# Patient Record
Sex: MALE | Race: WHITE | Employment: OTHER | URBAN - METROPOLITAN AREA
[De-identification: names, ages, dates, MRNs, and addresses within clinical notes are randomized per-mention and may not be internally consistent; named-entity substitution may affect disease eponyms.]

---

## 2017-08-30 ENCOUNTER — TELEPHONE (OUTPATIENT)
Dept: DERMATOLOGY | Age: 77
End: 2017-08-30

## 2017-09-05 ENCOUNTER — OFFICE VISIT (OUTPATIENT)
Dept: DERMATOLOGY | Age: 77
End: 2017-09-05

## 2017-09-05 DIAGNOSIS — L57.0 AK (ACTINIC KERATOSIS): ICD-10-CM

## 2017-09-05 DIAGNOSIS — C44.729 SQUAMOUS CELL CARCINOMA OF LEFT LOWER LEG: ICD-10-CM

## 2017-09-05 DIAGNOSIS — D48.5 NEOPLASM OF UNCERTAIN BEHAVIOR OF SKIN: Primary | ICD-10-CM

## 2017-09-05 DIAGNOSIS — L43.9 LICHEN PLANUS: ICD-10-CM

## 2017-09-05 PROCEDURE — 11101 PR BIOPSY, EACH ADDED LESION: CPT | Performed by: DERMATOLOGY

## 2017-09-05 PROCEDURE — 11100 PR BIOPSY OF SKIN LESION: CPT | Performed by: DERMATOLOGY

## 2017-09-05 PROCEDURE — 17262 DSTRJ MAL LES T/A/L 1.1-2.0: CPT | Performed by: DERMATOLOGY

## 2017-09-05 PROCEDURE — 69100 BIOPSY OF EXTERNAL EAR: CPT | Performed by: DERMATOLOGY

## 2017-09-05 PROCEDURE — 99202 OFFICE O/P NEW SF 15 MIN: CPT | Performed by: DERMATOLOGY

## 2017-09-05 RX ORDER — ESCITALOPRAM OXALATE 10 MG/1
5 TABLET ORAL DAILY
COMMUNITY

## 2017-09-11 ENCOUNTER — TELEPHONE (OUTPATIENT)
Dept: DERMATOLOGY | Age: 77
End: 2017-09-11

## 2017-09-21 ENCOUNTER — PROCEDURE VISIT (OUTPATIENT)
Dept: DERMATOLOGY | Age: 77
End: 2017-09-21

## 2017-09-21 DIAGNOSIS — C44.91 BASAL CELL CARCINOMA OF SKIN: ICD-10-CM

## 2017-09-21 DIAGNOSIS — Z79.899 HIGH RISK MEDICATION USE: ICD-10-CM

## 2017-09-21 DIAGNOSIS — C44.92 SQUAMOUS CELL SKIN CANCER: Primary | ICD-10-CM

## 2017-09-21 DIAGNOSIS — L57.0 AK (ACTINIC KERATOSIS): ICD-10-CM

## 2017-09-21 PROCEDURE — 99214 OFFICE O/P EST MOD 30 MIN: CPT | Performed by: DERMATOLOGY

## 2017-09-21 PROCEDURE — 17262 DSTRJ MAL LES T/A/L 1.1-2.0: CPT | Performed by: DERMATOLOGY

## 2017-09-21 PROCEDURE — 17261 DSTRJ MAL LES T/A/L .6-1.0CM: CPT | Performed by: DERMATOLOGY

## 2017-09-27 ENCOUNTER — OFFICE VISIT (OUTPATIENT)
Dept: DERMATOLOGY | Age: 77
End: 2017-09-27

## 2017-09-27 DIAGNOSIS — C44.91 BASAL CELL CARCINOMA OF SKIN: ICD-10-CM

## 2017-09-27 DIAGNOSIS — Z79.899 HIGH RISK MEDICATION USE: ICD-10-CM

## 2017-09-27 DIAGNOSIS — Z85.828 HISTORY OF NONMELANOMA SKIN CANCER: ICD-10-CM

## 2017-09-27 DIAGNOSIS — L57.0 AK (ACTINIC KERATOSIS): ICD-10-CM

## 2017-09-27 DIAGNOSIS — C44.92 SQUAMOUS CELL SKIN CANCER: Primary | ICD-10-CM

## 2017-09-27 PROCEDURE — 96406 CHEMO INTRALESIONAL OVER 7: CPT | Performed by: DERMATOLOGY

## 2017-09-27 PROCEDURE — 99214 OFFICE O/P EST MOD 30 MIN: CPT | Performed by: DERMATOLOGY

## 2017-09-28 DIAGNOSIS — Z79.60 LONG-TERM USE OF IMMUNOSUPPRESSANT MEDICATION: ICD-10-CM

## 2017-09-28 DIAGNOSIS — C44.92 SQUAMOUS CELL SKIN CANCER: Primary | ICD-10-CM

## 2017-10-12 ENCOUNTER — OFFICE VISIT (OUTPATIENT)
Dept: DERMATOLOGY | Age: 77
End: 2017-10-12

## 2017-10-12 DIAGNOSIS — L57.0 AK (ACTINIC KERATOSIS): ICD-10-CM

## 2017-10-12 DIAGNOSIS — Z85.828 HISTORY OF NONMELANOMA SKIN CANCER: ICD-10-CM

## 2017-10-12 DIAGNOSIS — C44.91 BASAL CELL CARCINOMA OF SKIN: ICD-10-CM

## 2017-10-12 DIAGNOSIS — L57.8 DIFFUSE PHOTODAMAGE OF SKIN: ICD-10-CM

## 2017-10-12 DIAGNOSIS — Z79.899 HIGH RISK MEDICATION USE: ICD-10-CM

## 2017-10-12 DIAGNOSIS — C44.92 SQUAMOUS CELL SKIN CANCER: Primary | ICD-10-CM

## 2017-10-12 PROCEDURE — 99213 OFFICE O/P EST LOW 20 MIN: CPT | Performed by: DERMATOLOGY

## 2017-10-12 PROCEDURE — 96406 CHEMO INTRALESIONAL OVER 7: CPT | Performed by: DERMATOLOGY

## 2017-10-12 RX ORDER — ACITRETIN 25 MG/1
CAPSULE ORAL
COMMUNITY
Start: 2017-09-14 | End: 2017-10-12 | Stop reason: SDUPTHER

## 2017-10-12 RX ORDER — ACITRETIN 25 MG/1
CAPSULE ORAL
Qty: 90 CAPSULE | Refills: 1 | Status: SHIPPED | OUTPATIENT
Start: 2017-10-12 | End: 2017-11-30 | Stop reason: SDUPTHER

## 2017-10-12 NOTE — PROGRESS NOTES
[facility]) Dermatology  Darnell Mcclelland M.D.  [phone]        Bers  1940    67 y.o. male     Date of Visit: 10/12/2017    Chief Complaint:   Chief Complaint   Patient presents with    Squamous Cell Carcinoma     5 FU injections        I was asked to see this patient by Dr. Pineda ref. provider found. History of Present Illness:  1. 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 He continues on acitretin 25 mg p.o. q. day. Did have laboratory studies drawn including CBC and liver function tests which are within normal limits. Tolerating acitretin without difficulty. Has had good improvement of squamous cell carcinomas with intralesional 5-fluorouracil. Has had 2 previous injections. Multiple lesions have resolved or reduced in size. Still has multiple squamous cell carcinomas. He does have a biopsy-proven basal cell carcinoma on his left helix. Mohs surgery is planned for this lesion with Sai Bates    He also has widespread actinic keratoses on his face. Has done Efudex previously but it was many years ago. Has more recently been treated with liquid nitrogen. Widespread severe photodamage-patient aware of need for hats, sunscreen, sun avoidance      Review of Systems:  Constitutional: Reports general sense of well-being       Past Medical History, Surgical History, Family History, Medications and Allergies reviewed. Social History:   Social History     Social History    Marital status:      Spouse name: N/A    Number of children: N/A    Years of education: N/A     Occupational History    Not on file.      Social History Main Topics    Smoking status: Never Smoker    Smokeless tobacco: Never Used    Alcohol use Yes    Drug use: Unknown    Sexual activity: Not on file     Other Topics Concern    Not on file     Social History Narrative    No narrative on file       Physical Examination       -General: Well-appearing, NAD  Limited exam.  Multiple gritty erythematous papules over his face, almost confluent-actinic keratoses. Background erythema and scale and lower legs improved but multiple keratotic papules ranging in size from 4-1.3 cm. Diffuse severe photo damage with freckling and lentigines                  Assessment and Plan     1. Squamous cell skin cancer -All lesions initially anesthetized with 1% lidocaine with epinephrine -IL 5-fluorouracil  50 mg/ml; total 1.5 ml to 11 lesion(s). Specifically discussed risk of side effects including infection, skin necrosis, systemic absorption. 2. High risk medication use -Continue acitretin 25 mg p.o. q. day. Liver function tests within normal limits. Recheck 1 month liver function tests. Reviewed potential side effects. 3. AK (actinic keratosis) -After completion of intralesional fluorouracil to lower legs will consider topical fluorouracil to face. 4. Basal cell carcinoma of skin - scheduled Mohs surgery with Darell Graves    5. Diffuse photodamage of skin -Hats, sunscreen, sun avoidance    6.  History of nonmelanoma skin cancer -Careful surveillance

## 2017-10-16 ENCOUNTER — TELEPHONE (OUTPATIENT)
Dept: DERMATOLOGY | Age: 77
End: 2017-10-16

## 2017-10-16 DIAGNOSIS — C44.219 BASAL CELL CARCINOMA OF SKIN OF LEFT EAR: Primary | ICD-10-CM

## 2017-10-26 ENCOUNTER — PROCEDURE VISIT (OUTPATIENT)
Dept: DERMATOLOGY | Age: 77
End: 2017-10-26

## 2017-10-26 DIAGNOSIS — L57.0 AK (ACTINIC KERATOSIS): ICD-10-CM

## 2017-10-26 DIAGNOSIS — D48.5 NEOPLASM OF UNCERTAIN BEHAVIOR OF SKIN: ICD-10-CM

## 2017-10-26 DIAGNOSIS — L57.8 DIFFUSE PHOTODAMAGE OF SKIN: ICD-10-CM

## 2017-10-26 DIAGNOSIS — C44.92 SQUAMOUS CELL SKIN CANCER: Primary | ICD-10-CM

## 2017-10-26 PROCEDURE — 99214 OFFICE O/P EST MOD 30 MIN: CPT | Performed by: DERMATOLOGY

## 2017-10-26 PROCEDURE — 11100 PR BIOPSY OF SKIN LESION: CPT | Performed by: DERMATOLOGY

## 2017-10-26 PROCEDURE — 96406 CHEMO INTRALESIONAL OVER 7: CPT | Performed by: DERMATOLOGY

## 2017-10-26 NOTE — PROGRESS NOTES
Subjective:      Patient ID: Donell Ramirez is a 67 y.o. male.     HPI    Review of Systems    Objective:   Physical Exam

## 2017-11-02 ENCOUNTER — TELEPHONE (OUTPATIENT)
Dept: DERMATOLOGY | Age: 77
End: 2017-11-02

## 2017-11-02 DIAGNOSIS — C44.319 BASAL CELL CARCINOMA OF SKIN OF OTHER PART OF FACE: Primary | ICD-10-CM

## 2017-11-02 NOTE — TELEPHONE ENCOUNTER
Reviewed results of the biopsy with the patient. The patient expressed understanding of the plan.      Referral faxed to Dr. Basim rutledge

## 2017-11-08 DIAGNOSIS — Z79.899 LONG-TERM USE OF HIGH-RISK MEDICATION: Primary | ICD-10-CM

## 2017-11-20 ENCOUNTER — TELEPHONE (OUTPATIENT)
Dept: DERMATOLOGY | Age: 77
End: 2017-11-20

## 2017-11-20 NOTE — TELEPHONE ENCOUNTER
Dr. Ted Molina would like to speak to  in regards to the patients lab results.     Dr. Ted Molina can be reached at [phone]

## 2017-11-21 NOTE — TELEPHONE ENCOUNTER
Patient's primary care doctor contacted me because his hemoglobin and hematocrit are elevated. He wondered if this could be related to intralesional 5-fluorouracil. Discussed that usually when people develop hematologic abnormalities from 5-fluorouracil, there is bone marrow suppression. Dr. Jacob Salas noted the patient does have a history of mild renal insufficiency-suspect that he is hemoconcentrated and will follow up.

## 2017-11-30 ENCOUNTER — OFFICE VISIT (OUTPATIENT)
Dept: DERMATOLOGY | Age: 77
End: 2017-11-30

## 2017-11-30 DIAGNOSIS — L57.0 AK (ACTINIC KERATOSIS): ICD-10-CM

## 2017-11-30 DIAGNOSIS — C44.92 SQUAMOUS CELL SKIN CANCER: Primary | ICD-10-CM

## 2017-11-30 DIAGNOSIS — L57.8 DIFFUSE PHOTODAMAGE OF SKIN: ICD-10-CM

## 2017-11-30 PROCEDURE — 99213 OFFICE O/P EST LOW 20 MIN: CPT | Performed by: DERMATOLOGY

## 2017-11-30 PROCEDURE — 96406 CHEMO INTRALESIONAL OVER 7: CPT | Performed by: DERMATOLOGY

## 2017-11-30 RX ORDER — FLUOROURACIL 50 MG/G
CREAM TOPICAL
Qty: 40 G | Refills: 0 | Status: SHIPPED | OUTPATIENT
Start: 2017-11-30 | End: 2019-04-11 | Stop reason: ALTCHOICE

## 2017-11-30 RX ORDER — ACITRETIN 25 MG/1
CAPSULE ORAL
Qty: 90 CAPSULE | Refills: 1 | Status: SHIPPED | OUTPATIENT
Start: 2017-11-30 | End: 2018-02-27 | Stop reason: SDUPTHER

## 2017-11-30 NOTE — PROGRESS NOTES
[address]  Dermatology  Davide Posada M.D.  [phone]       Ryley Reynolds  1940    65 y.o. male     Date of Visit: 11/30/2017    Chief Complaint:   Chief Complaint   Patient presents with    Follow-up     5FU        I was asked to see this patient by Dr. Pineda ref. provider found. History of Present Illness:  1. Patient presents today for follow-up    Patient has a history of squamous cell carcinomas on bilateral lower legs. He has been on acitretin since September 2017, currently on acitretin 25 mg p.o. q. day. He has tolerated this without difficulty-mild xerosis of his lips. Labs have been stable, with elevated hemoglobin and hematocrit-spoke with primary care doctor regarding this last week. Patient states that hemoglobin and hematocrit have been elevated since chemotherapy for metastatic colon cancer. This has been previously evaluated by his oncologist.    He has also had 4 previous injections of 5-fluorouracil to squamous cell carcinomas on his lower legs. Excellent improvement. He still has a few small residual lesions on his lower legs. 2 newer keratotic papules, one on each dorsal forearm. Widespread actinic damage with multiple actinic keratoses on his face. We have planned for Efudex January 2017. Skin history:  History of metastatic colon cancer currently in remission. No prior history of sebaceous carcinoma. History of multiple nonmelanoma skin cancers including multiple squamous cell carcinomas treated previously in his lower legs with curette, Mohs surgery, liquid nitrogen at outside hospital    11/17 right lateral forehead basal cell carcinoma status post Mohs  11/17 left mid helix basal cell carcinoma status post Mohs      Review of Systems:  Constitutional: Reports general sense of well-being       Past Medical History, Surgical History, Family History, Medications and Allergies reviewed.     Social History:   Social History     Social History    Marital status:      Spouse name: N/A    Number of children: N/A    Years of education: N/A     Occupational History    Not on file. Social History Main Topics    Smoking status: Never Smoker    Smokeless tobacco: Never Used    Alcohol use Yes    Drug use: Unknown    Sexual activity: Not on file     Other Topics Concern    Not on file     Social History Narrative    No narrative on file       Physical Examination       -General: Well-appearing, NAD  Well-developed well-nourished. 1.0 cm keratotic plaque left dorsal forearm and right dorsal forearm. 3 smaller keratotic papules bilateral lower legs-residual squamous cell carcinomas, previously injected with 5-fluorouracil. Widespread areas of postinflammatory erythema. Diffuse severe actinic change with multiple gritty erythematous papules over his face, scalp, ears. Well-healed scar right forehead at site of most recent Mohs surgery. Left helix granulating with no sign of infection. Assessment and Plan     1. Squamous cell skin cancer - After the risks, palpitations, alternatives were explained to the patient, informed consent was obtained. 5 squamous cell carcinomas, one left forearm, one right forearm, 2 left lower leg, one right lower leg all injected with a total of 0.8 mL of 50 mg/mL of 5-fluorouracil. Wound care instructions and contact information given to the patient. Continue acitretin 25 mg p.o. q. day. Refill provided. Reviewed side effects and contraindications. Patient will be due for repeat lab check in 6 weeks-after his next appointment. He will continue to follow with his primary care doctor and oncologist for elevated hemoglobin and hematocrit. 2. AK (actinic keratosis) -Efudex 5% cream b.i.d. ×2 weeks scalp, forehead, temples, cheeks, tops of the ears-avoiding areas around mouth- for 2 weeks prior to his next appointment in January. Patient has completed Efudex course this previously. Reviewed side effects, contraindications, proper use. Discussed importance of photoprotection.      3. Diffuse photodamage of skin -strict sun avoidance with hats, sunscreen, sun avoidance

## 2018-01-24 ENCOUNTER — TELEPHONE (OUTPATIENT)
Dept: DERMATOLOGY | Age: 78
End: 2018-01-24

## 2018-01-24 NOTE — TELEPHONE ENCOUNTER
Hui Olmedo is ill, he is running a fever. He is scheduled tomorrow for an efudex follow up but I told him that if he is running a fever he shouldn't come in. He is on his 14th day now and is wondering if he should stop it. When could he come in next week?

## 2018-02-08 ENCOUNTER — PROCEDURE VISIT (OUTPATIENT)
Dept: DERMATOLOGY | Age: 78
End: 2018-02-08

## 2018-02-08 DIAGNOSIS — L30.0 NUMMULAR DERMATITIS: ICD-10-CM

## 2018-02-08 DIAGNOSIS — Z79.899 LONG-TERM USE OF HIGH-RISK MEDICATION: ICD-10-CM

## 2018-02-08 DIAGNOSIS — D48.5 NEOPLASM OF UNCERTAIN BEHAVIOR OF SKIN: Primary | ICD-10-CM

## 2018-02-08 DIAGNOSIS — L57.0 AK (ACTINIC KERATOSIS): ICD-10-CM

## 2018-02-08 DIAGNOSIS — Z85.828 HISTORY OF NONMELANOMA SKIN CANCER: ICD-10-CM

## 2018-02-08 PROCEDURE — 11100 PR BIOPSY OF SKIN LESION: CPT | Performed by: DERMATOLOGY

## 2018-02-08 PROCEDURE — 99214 OFFICE O/P EST MOD 30 MIN: CPT | Performed by: DERMATOLOGY

## 2018-02-08 PROCEDURE — 11101 PR BIOPSY, EACH ADDED LESION: CPT | Performed by: DERMATOLOGY

## 2018-02-13 ENCOUNTER — TELEPHONE (OUTPATIENT)
Dept: DERMATOLOGY | Age: 78
End: 2018-02-13

## 2018-02-20 ENCOUNTER — TELEPHONE (OUTPATIENT)
Dept: DERMATOLOGY | Age: 78
End: 2018-02-20

## 2018-02-27 RX ORDER — ACITRETIN 25 MG/1
CAPSULE ORAL
Qty: 90 CAPSULE | Refills: 1 | Status: SHIPPED | OUTPATIENT
Start: 2018-02-27 | End: 2018-07-18 | Stop reason: SDUPTHER

## 2018-02-27 NOTE — TELEPHONE ENCOUNTER
Pt calling states that [facility] needs approval to fill medication Acitretin 25mg cap pls call [facility] to discuss any questions pls call pt to @ [phone] thank you

## 2018-04-25 ENCOUNTER — OFFICE VISIT (OUTPATIENT)
Dept: DERMATOLOGY | Age: 78
End: 2018-04-25

## 2018-04-25 DIAGNOSIS — L57.0 AK (ACTINIC KERATOSIS): ICD-10-CM

## 2018-04-25 DIAGNOSIS — L57.8 DIFFUSE PHOTODAMAGE OF SKIN: ICD-10-CM

## 2018-04-25 DIAGNOSIS — Z85.828 HISTORY OF NONMELANOMA SKIN CANCER: ICD-10-CM

## 2018-04-25 DIAGNOSIS — C44.519 BASAL CELL CARCINOMA OF BACK: ICD-10-CM

## 2018-04-25 DIAGNOSIS — Z85.820 HISTORY OF MALIGNANT MELANOMA: ICD-10-CM

## 2018-04-25 DIAGNOSIS — D48.5 NEOPLASM OF UNCERTAIN BEHAVIOR OF SKIN: ICD-10-CM

## 2018-04-25 DIAGNOSIS — L43.9 LICHEN PLANUS: ICD-10-CM

## 2018-04-25 DIAGNOSIS — Z79.899 HIGH RISK MEDICATION USE: Primary | ICD-10-CM

## 2018-04-25 PROCEDURE — 17000 DESTRUCT PREMALG LESION: CPT | Performed by: DERMATOLOGY

## 2018-04-25 PROCEDURE — 11101 PR BIOPSY, EACH ADDED LESION: CPT | Performed by: DERMATOLOGY

## 2018-04-25 PROCEDURE — 11901 INJECT SKIN LESIONS >7: CPT | Performed by: DERMATOLOGY

## 2018-04-25 PROCEDURE — 99214 OFFICE O/P EST MOD 30 MIN: CPT | Performed by: DERMATOLOGY

## 2018-04-25 PROCEDURE — 17003 DESTRUCT PREMALG LES 2-14: CPT | Performed by: DERMATOLOGY

## 2018-04-25 PROCEDURE — 17261 DSTRJ MAL LES T/A/L .6-1.0CM: CPT | Performed by: DERMATOLOGY

## 2018-04-25 PROCEDURE — 11100 PR BIOPSY OF SKIN LESION: CPT | Performed by: DERMATOLOGY

## 2018-05-01 ENCOUNTER — TELEPHONE (OUTPATIENT)
Dept: DERMATOLOGY | Age: 78
End: 2018-05-01

## 2018-05-01 DIAGNOSIS — D03.61 MELANOMA IN SITU OF RIGHT UPPER EXTREMITY INCLUDING SHOULDER (HCC): Primary | ICD-10-CM

## 2018-07-18 ENCOUNTER — OFFICE VISIT (OUTPATIENT)
Dept: DERMATOLOGY | Age: 78
End: 2018-07-18

## 2018-07-18 DIAGNOSIS — L82.1 SEBORRHEIC KERATOSES: ICD-10-CM

## 2018-07-18 DIAGNOSIS — C44.629 SQUAMOUS CELL CARCINOMA OF LEFT WRIST: ICD-10-CM

## 2018-07-18 DIAGNOSIS — Z79.899 HIGH RISK MEDICATION USE: ICD-10-CM

## 2018-07-18 DIAGNOSIS — C44.619 BASAL CELL CARCINOMA OF LEFT FOREARM: ICD-10-CM

## 2018-07-18 DIAGNOSIS — D22.9 BENIGN NEVUS: ICD-10-CM

## 2018-07-18 DIAGNOSIS — C44.41 BASAL CELL CARCINOMA OF NECK: ICD-10-CM

## 2018-07-18 DIAGNOSIS — Z85.828 HISTORY OF NONMELANOMA SKIN CANCER: ICD-10-CM

## 2018-07-18 DIAGNOSIS — D48.5 NEOPLASM OF UNCERTAIN BEHAVIOR OF SKIN: Primary | ICD-10-CM

## 2018-07-18 DIAGNOSIS — Z85.820 HISTORY OF MALIGNANT MELANOMA: ICD-10-CM

## 2018-07-18 DIAGNOSIS — L57.0 AK (ACTINIC KERATOSIS): ICD-10-CM

## 2018-07-18 PROCEDURE — 17003 DESTRUCT PREMALG LES 2-14: CPT | Performed by: DERMATOLOGY

## 2018-07-18 PROCEDURE — 17000 DESTRUCT PREMALG LESION: CPT | Performed by: DERMATOLOGY

## 2018-07-18 PROCEDURE — 17261 DSTRJ MAL LES T/A/L .6-1.0CM: CPT | Performed by: DERMATOLOGY

## 2018-07-18 PROCEDURE — 17271 DSTR MAL LES S/N/H/F/G 0.6-1: CPT | Performed by: DERMATOLOGY

## 2018-07-18 PROCEDURE — 11100 PR BIOPSY OF SKIN LESION: CPT | Performed by: DERMATOLOGY

## 2018-07-18 PROCEDURE — 99214 OFFICE O/P EST MOD 30 MIN: CPT | Performed by: DERMATOLOGY

## 2018-07-18 PROCEDURE — 17262 DSTRJ MAL LES T/A/L 1.1-2.0: CPT | Performed by: DERMATOLOGY

## 2018-07-18 RX ORDER — ACITRETIN 25 MG/1
CAPSULE ORAL
Qty: 90 CAPSULE | Refills: 1 | Status: SHIPPED | OUTPATIENT
Start: 2018-07-18 | End: 2018-11-29 | Stop reason: SDUPTHER

## 2018-07-18 NOTE — PROGRESS NOTES
Systems:  Constitutional: Reports general sense of well-being   Denies acitretin related side effects    Past Medical History, Surgical History, Family History, Medications and Allergies reviewed. Social History:   Social History     Social History    Marital status:      Spouse name: N/A    Number of children: N/A    Years of education: N/A     Occupational History    Not on file. Social History Main Topics    Smoking status: Never Smoker    Smokeless tobacco: Never Used    Alcohol use Yes    Drug use: Unknown    Sexual activity: Not on file     Other Topics Concern    Not on file     Social History Narrative    No narrative on file       Physical Examination       -General: Well-appearing, NAD  1. .Normal affect. Total body skin exam including scalp, face, neck, chest, abdomen, back, bilateral upper extremities, bilateral lower extremities, ocular conjunctiva, external lips, and nails was performed. Examination normal unless stated below. Underwear area not examined. Scattered on the trunk and extremities are multiple well-defined round and oval symmetric smoothly-bordered uniformly brown macules and papules. Multiple gritty erythematous papules face, scalp, torso, arms. Mild xerosis of his lips    Multiple hyperkeratotic stuck on papules and plaques over his torso          Left dorsal distal forearm 1.1 cm erythematous plaque rule out basal cell carcinoma  Left thenar wrist 7 mm pink papule rule out basal cell carcinoma  Left mid paraspinal back 1 cm irregularly pigmented brown plaque-irritated seborrheic keratosis rule out atypia  Mid posterior neck 8 mm pink papule rule out basal cell carcinoma      Assessment and Plan     1.  Neoplasm of uncertain behavior of skin   Left dorsal distal forearm 1.1 cm erythematous plaque rule out basal cell carcinoma  Left thenar wrist 7 mm pink papule rule out basal cell carcinoma  Left mid paraspinal back 1 cm irregularly pigmented Aric Humphrey

## 2018-07-24 ENCOUNTER — TELEPHONE (OUTPATIENT)
Dept: DERMATOLOGY | Age: 78
End: 2018-07-24

## 2018-07-24 NOTE — TELEPHONE ENCOUNTER
Reviewed results of the biopsy with the patient. Date of biopsy: 7/18/18  Site of biopsy: Left dorsal distal forearm  Result: BCC, nodular type    Plan: Tx with curette at bx    The patient expressed understanding of the plan. Reviewed results of the biopsy with the patient. Date of biopsy: 7/18/18  Site of biopsy: Left thenar wrist  Result: SCC, well differentiated    Plan: Tx with curette at bx    The patient expressed understanding of the plan. Reviewed results of the biopsy with the patient. Date of biopsy: 7/18/18  Site of biopsy: Left mid paraspinal keratosis  Result: Inflamed SK    Plan: No further treatment    The patient expressed understanding of the plan. Reviewed results of the biopsy with the patient. Date of biopsy: 7/18/18  Site of biopsy: Mid posterior neck   Result: BCC, superficial type    Plan: Tx with curette at bx    The patient expressed understanding of the plan.

## 2018-09-13 ENCOUNTER — OFFICE VISIT (OUTPATIENT)
Dept: DERMATOLOGY | Age: 78
End: 2018-09-13

## 2018-09-13 DIAGNOSIS — C44.519: ICD-10-CM

## 2018-09-13 DIAGNOSIS — L43.9 LICHEN PLANUS: ICD-10-CM

## 2018-09-13 DIAGNOSIS — Z85.820 HISTORY OF MALIGNANT MELANOMA: ICD-10-CM

## 2018-09-13 DIAGNOSIS — L57.0 AK (ACTINIC KERATOSIS): ICD-10-CM

## 2018-09-13 DIAGNOSIS — Z79.899 HIGH RISK MEDICATION USE: ICD-10-CM

## 2018-09-13 DIAGNOSIS — D48.5 NEOPLASM OF UNCERTAIN BEHAVIOR OF SKIN: Primary | ICD-10-CM

## 2018-09-13 DIAGNOSIS — C44.619 BASAL CELL CARCINOMA, ARM, LEFT: ICD-10-CM

## 2018-09-13 DIAGNOSIS — D22.9 BENIGN NEVUS: ICD-10-CM

## 2018-09-13 DIAGNOSIS — Z85.828 HISTORY OF NONMELANOMA SKIN CANCER: ICD-10-CM

## 2018-09-13 PROCEDURE — 17003 DESTRUCT PREMALG LES 2-14: CPT | Performed by: DERMATOLOGY

## 2018-09-13 PROCEDURE — 17262 DSTRJ MAL LES T/A/L 1.1-2.0: CPT | Performed by: DERMATOLOGY

## 2018-09-13 PROCEDURE — 99214 OFFICE O/P EST MOD 30 MIN: CPT | Performed by: DERMATOLOGY

## 2018-09-13 PROCEDURE — 17000 DESTRUCT PREMALG LESION: CPT | Performed by: DERMATOLOGY

## 2018-09-13 PROCEDURE — 11100 PR BIOPSY OF SKIN LESION: CPT | Performed by: DERMATOLOGY

## 2018-09-13 RX ORDER — ATORVASTATIN CALCIUM 10 MG/1
10 TABLET, FILM COATED ORAL DAILY
COMMUNITY
Start: 2018-08-30 | End: 2021-04-22

## 2018-09-20 LAB — DERMATOLOGY PATHOLOGY REPORT: ABNORMAL

## 2018-09-25 ENCOUNTER — TELEPHONE (OUTPATIENT)
Dept: DERMATOLOGY | Age: 78
End: 2018-09-25

## 2018-10-11 ENCOUNTER — PROCEDURE VISIT (OUTPATIENT)
Dept: DERMATOLOGY | Age: 78
End: 2018-10-11
Payer: MEDICARE

## 2018-10-11 DIAGNOSIS — Z85.828 HISTORY OF NONMELANOMA SKIN CANCER: ICD-10-CM

## 2018-10-11 DIAGNOSIS — L43.9 LICHEN PLANUS: Primary | ICD-10-CM

## 2018-10-11 DIAGNOSIS — Z85.820 HISTORY OF MALIGNANT MELANOMA: ICD-10-CM

## 2018-10-11 DIAGNOSIS — L57.8 DIFFUSE PHOTODAMAGE OF SKIN: ICD-10-CM

## 2018-10-11 PROCEDURE — 11901 INJECT SKIN LESIONS >7: CPT | Performed by: DERMATOLOGY

## 2018-10-11 PROCEDURE — 99213 OFFICE O/P EST LOW 20 MIN: CPT | Performed by: DERMATOLOGY

## 2018-10-11 NOTE — PROGRESS NOTES
every other day chemo prevention     Lichen planus- clobetasol, intralesional Kenalog    Review of Systems:  Constitutional: Reports general sense of well-being       Past Medical History, Surgical History, Family History, Medications and Allergies reviewed. Social History:   Social History     Social History    Marital status:      Spouse name: N/A    Number of children: N/A    Years of education: N/A     Occupational History    Not on file. Social History Main Topics    Smoking status: Never Smoker    Smokeless tobacco: Never Used    Alcohol use Yes    Drug use: Unknown    Sexual activity: Not on file     Other Topics Concern    Not on file     Social History Narrative    No narrative on file       Physical Examination       -General: Well-appearing, NAD  1. Limited exam-granulating curettage sites of basal cell carcinoma no sign of infection. Multiple erythematous scaly papules and plaques over his lower legs and dorsal feet-lichen planus. Diffuse background photodamage with freckling and lentigines. Multiple well-healed scars no sign of recurrence      Assessment and Plan     1. Lichen planus - -IL kenalog 10 mg/ml; total 1.5 ml to 10 lesion(s). Edu re: atrophy, dyspigmentation. Sample of Ultravate lotion to be used b.i.d. for the next 2 weeks. Reviewed proper use and side effects     2. Diffuse photodamage of skin -Hats, sunscreen, sun avoidance    3. History of nonmelanoma skin cancer -Healing well at most recent treatment of her basal cell carcinomas. Continue acitretin 25 mg every other day as he has been.      4. History of malignant melanoma -Monitor carefully for new or changing pigmented lesion

## 2018-11-29 ENCOUNTER — OFFICE VISIT (OUTPATIENT)
Dept: DERMATOLOGY | Age: 78
End: 2018-11-29
Payer: MEDICARE

## 2018-11-29 DIAGNOSIS — D22.9 BENIGN NEVUS: ICD-10-CM

## 2018-11-29 DIAGNOSIS — C44.519 BASAL CELL CARCINOMA (BCC) OF BACK: ICD-10-CM

## 2018-11-29 DIAGNOSIS — Z79.899 HIGH RISK MEDICATION USE: ICD-10-CM

## 2018-11-29 DIAGNOSIS — Z85.820 HISTORY OF MALIGNANT MELANOMA: ICD-10-CM

## 2018-11-29 DIAGNOSIS — C44.629 SCC (SQUAMOUS CELL CARCINOMA), HAND, LEFT: ICD-10-CM

## 2018-11-29 DIAGNOSIS — C44.619 BASAL CELL CARCINOMA, ARM, LEFT: ICD-10-CM

## 2018-11-29 DIAGNOSIS — R79.89 OTHER SPECIFIED ABNORMAL FINDINGS OF BLOOD CHEMISTRY: ICD-10-CM

## 2018-11-29 DIAGNOSIS — L57.0 AK (ACTINIC KERATOSIS): ICD-10-CM

## 2018-11-29 DIAGNOSIS — Z85.828 HISTORY OF NONMELANOMA SKIN CANCER: ICD-10-CM

## 2018-11-29 DIAGNOSIS — L43.9 LICHEN PLANUS: Primary | ICD-10-CM

## 2018-11-29 DIAGNOSIS — D48.5 NEOPLASM OF UNCERTAIN BEHAVIOR OF SKIN: ICD-10-CM

## 2018-11-29 PROCEDURE — 17003 DESTRUCT PREMALG LES 2-14: CPT | Performed by: DERMATOLOGY

## 2018-11-29 PROCEDURE — 11100 PR BIOPSY OF SKIN LESION: CPT | Performed by: DERMATOLOGY

## 2018-11-29 PROCEDURE — 17261 DSTRJ MAL LES T/A/L .6-1.0CM: CPT | Performed by: DERMATOLOGY

## 2018-11-29 PROCEDURE — 17272 DSTR MAL LES S/N/H/F/G 1.1-2: CPT | Performed by: DERMATOLOGY

## 2018-11-29 PROCEDURE — 99214 OFFICE O/P EST MOD 30 MIN: CPT | Performed by: DERMATOLOGY

## 2018-11-29 PROCEDURE — 17000 DESTRUCT PREMALG LESION: CPT | Performed by: DERMATOLOGY

## 2018-11-29 PROCEDURE — 11101 PR BIOPSY, EACH ADDED LESION: CPT | Performed by: DERMATOLOGY

## 2018-11-29 RX ORDER — ACITRETIN 25 MG/1
CAPSULE ORAL
Qty: 90 CAPSULE | Refills: 1 | Status: SHIPPED | OUTPATIENT
Start: 2018-11-29 | End: 2019-01-17 | Stop reason: SDUPTHER

## 2018-11-29 RX ORDER — CLOBETASOL PROPIONATE 0.5 MG/G
OINTMENT TOPICAL
Qty: 120 G | Refills: 2 | Status: SHIPPED | OUTPATIENT
Start: 2018-11-29

## 2018-11-29 NOTE — PROGRESS NOTES
squamous cell carcinoma status post curettage  7/18 mid posterior neck superficial basal cell carcinoma status post curettage     Acitretin 25 mg every other day chemo prevention     Lichen planus- clobetasol, intralesional Kenalog     Review of Systems:  Constitutional: Reports general sense of well-being   Mild xerosis of lips. No nausea, vomiting    Past Medical History, Surgical History, Family History, Medications and Allergies reviewed. Social History:   Social History     Social History    Marital status:      Spouse name: N/A    Number of children: N/A    Years of education: N/A     Occupational History    Not on file. Social History Main Topics    Smoking status: Never Smoker    Smokeless tobacco: Never Used    Alcohol use Yes    Drug use: Unknown    Sexual activity: Not on file     Other Topics Concern    Not on file     Social History Narrative    No narrative on file       Physical Examination       -General: Well-appearing, NAD  1. Normal affect. Total body skin exam including scalp, face, neck, chest, abdomen, back, bilateral upper extremities, bilateral lower extremities, ocular conjunctiva, external lips, and nails was performed. Examination normal unless stated below. Underwear area not examined. Scattered on the trunk and extremities are multiple well-defined round and oval symmetric smoothly-bordered uniformly brown macules and papules. Multiple gritty erythematous papules ears, scalp, forehead  Widespread erythematous scaly papules and plaques over his lower legs, lower back, forearms-lichen planus.   Multiple well-healed scars with no sign of recurrence                      Left lateral thigh 7 mm pink papule rule out basal cell carcinoma  Left dorsal hand 1.1 cm keratotic plaque rule out squamous cell carcinoma  Left thenar forearm 7 mm pink papule rule out basal cell carcinoma  Right superior shoulder 7 mm pink papule rule out basal cell carcinoma  Right scapular back 6 mm pink papule rule out basal cell carcinoma    AST/ALT 10/8/18 within normal limits, recent cholesterol check with primary care doctor-reportedly well controlled    Assessment and Plan     1. Lichen planus - Increase acitretin to 25 mg p.o. q. day. Start clobetasol ointment b.i.d. to his bilateral lower legs until his next visit 6-8 weeks. Reviewed proper use and side effects. Lab check for acitretin prior to his next visit-cholesterol, LFTs    2. AK (actinic keratosis) - 5, scalp, ears, face lesion(s) treated w/ liquid nitrogen. Edu re: risk of blister formation, discomfort, scar, hypopigmentation. Discussed wound care. 3. Neoplasm of uncertain behavior of skin - left lateral thigh, left dorsal hand, left thenar forearm, right superior shoulder, right scapular back--Discussed possible diagnosis. Patient agreeable to biopsy. Verbal consent obtained after risks (infection, bleeding, scar), benefits and alternatives explained. -Area(s) to be biopsied were marked with a surgical pen. Site(s) were cleansed with alcohol. Local anesthesia achieved with 1% lidocaine with epinephrine/sodium bicarbonate. Shave biopsy performed with a razor blade. Hemostasis was achieved with aluminum chloride. The wound(s) were dressed with petrolatum and covered with a bandage. Specimen(s) sent to pathology. Pt educated re: risk of bleeding, infection, scar and wound care instructions. Curettage performed after informed written consent obtained following explanation of risks, benefits, alternatives  -Local anesthesia acheived with 1% lidocaine with epinephrine/sodium bicarbonate. Sharp curettage performed in 3 different directions. First pass size left lateral thigh 8 mm, left dorsal hand 1.2 cm, left thenar forearm 9 mm, right superior shoulder 9 mm, right scapular back 8 mm.  Hemostasis obtained with aluminum chloride and electrocautery.   -Edu re: bleeding, discomfort, infection, scar  -Edu re: wound care, risk of

## 2018-12-04 ENCOUNTER — TELEPHONE (OUTPATIENT)
Dept: DERMATOLOGY | Age: 78
End: 2018-12-04

## 2018-12-04 LAB
DERMATOLOGY PATHOLOGY REPORT: ABNORMAL
DERMATOLOGY PATHOLOGY REPORT: NORMAL

## 2019-01-17 ENCOUNTER — OFFICE VISIT (OUTPATIENT)
Dept: DERMATOLOGY | Age: 79
End: 2019-01-17
Payer: MEDICARE

## 2019-01-17 DIAGNOSIS — Z85.828 HISTORY OF NONMELANOMA SKIN CANCER: ICD-10-CM

## 2019-01-17 DIAGNOSIS — C44.529 SQUAMOUS CELL CARCINOMA OF SKIN OF CHEST: ICD-10-CM

## 2019-01-17 DIAGNOSIS — D48.5 NEOPLASM OF UNCERTAIN BEHAVIOR OF SKIN: Primary | ICD-10-CM

## 2019-01-17 DIAGNOSIS — L57.0 AK (ACTINIC KERATOSIS): ICD-10-CM

## 2019-01-17 DIAGNOSIS — D22.9 BENIGN NEVUS: ICD-10-CM

## 2019-01-17 DIAGNOSIS — L43.9 LICHEN PLANUS: ICD-10-CM

## 2019-01-17 DIAGNOSIS — Z85.820 HISTORY OF MALIGNANT MELANOMA: ICD-10-CM

## 2019-01-17 DIAGNOSIS — Z79.899 HIGH RISK MEDICATION USE: ICD-10-CM

## 2019-01-17 PROCEDURE — 17003 DESTRUCT PREMALG LES 2-14: CPT | Performed by: DERMATOLOGY

## 2019-01-17 PROCEDURE — 11103 TANGNTL BX SKIN EA SEP/ADDL: CPT | Performed by: DERMATOLOGY

## 2019-01-17 PROCEDURE — 17261 DSTRJ MAL LES T/A/L .6-1.0CM: CPT | Performed by: DERMATOLOGY

## 2019-01-17 PROCEDURE — 11102 TANGNTL BX SKIN SINGLE LES: CPT | Performed by: DERMATOLOGY

## 2019-01-17 PROCEDURE — 17000 DESTRUCT PREMALG LESION: CPT | Performed by: DERMATOLOGY

## 2019-01-17 PROCEDURE — 99214 OFFICE O/P EST MOD 30 MIN: CPT | Performed by: DERMATOLOGY

## 2019-01-17 RX ORDER — ACITRETIN 25 MG/1
CAPSULE ORAL
Qty: 90 CAPSULE | Refills: 1 | Status: SHIPPED | OUTPATIENT
Start: 2019-01-17 | End: 2019-09-18 | Stop reason: SDUPTHER

## 2019-01-21 LAB — DERMATOLOGY PATHOLOGY REPORT: ABNORMAL

## 2019-01-24 ENCOUNTER — TELEPHONE (OUTPATIENT)
Dept: DERMATOLOGY | Age: 79
End: 2019-01-24

## 2019-01-24 DIAGNOSIS — C44.319 BASAL CELL CARCINOMA (BCC) OF RIGHT CHEEK: Primary | ICD-10-CM

## 2019-02-08 ENCOUNTER — TELEPHONE (OUTPATIENT)
Dept: DERMATOLOGY | Age: 79
End: 2019-02-08

## 2019-04-11 ENCOUNTER — OFFICE VISIT (OUTPATIENT)
Dept: DERMATOLOGY | Age: 79
End: 2019-04-11
Payer: MEDICARE

## 2019-04-11 DIAGNOSIS — Z79.899 HIGH RISK MEDICATION USE: ICD-10-CM

## 2019-04-11 DIAGNOSIS — C44.42 SQUAMOUS CELL CARCINOMA OF SCALP: ICD-10-CM

## 2019-04-11 DIAGNOSIS — L82.1 SEBORRHEIC KERATOSES: ICD-10-CM

## 2019-04-11 DIAGNOSIS — Z85.820 HISTORY OF MALIGNANT MELANOMA: ICD-10-CM

## 2019-04-11 DIAGNOSIS — C44.622 SQUAMOUS CELL CARCINOMA OF ARM, RIGHT: ICD-10-CM

## 2019-04-11 DIAGNOSIS — L43.9 LICHEN PLANUS: ICD-10-CM

## 2019-04-11 DIAGNOSIS — D48.5 NEOPLASM OF UNCERTAIN BEHAVIOR OF SKIN: Primary | ICD-10-CM

## 2019-04-11 DIAGNOSIS — C44.619 BASAL CELL CARCINOMA, ARM, LEFT: ICD-10-CM

## 2019-04-11 DIAGNOSIS — Z85.828 HISTORY OF NONMELANOMA SKIN CANCER: ICD-10-CM

## 2019-04-11 DIAGNOSIS — D22.9 BENIGN NEVUS: ICD-10-CM

## 2019-04-11 PROCEDURE — 11102 TANGNTL BX SKIN SINGLE LES: CPT | Performed by: DERMATOLOGY

## 2019-04-11 PROCEDURE — 99214 OFFICE O/P EST MOD 30 MIN: CPT | Performed by: DERMATOLOGY

## 2019-04-11 PROCEDURE — 17261 DSTRJ MAL LES T/A/L .6-1.0CM: CPT | Performed by: DERMATOLOGY

## 2019-04-11 PROCEDURE — 11103 TANGNTL BX SKIN EA SEP/ADDL: CPT | Performed by: DERMATOLOGY

## 2019-04-11 PROCEDURE — 17262 DSTRJ MAL LES T/A/L 1.1-2.0: CPT | Performed by: DERMATOLOGY

## 2019-04-11 PROCEDURE — 17272 DSTR MAL LES S/N/H/F/G 1.1-2: CPT | Performed by: DERMATOLOGY

## 2019-04-11 NOTE — PROGRESS NOTES
[facility]) Dermatology  Nadeem Lane M.D.  [phone]       Jessica Sinclair  1940    77 y.o. male     Date of Visit: 4/11/2019    Chief Complaint:   Chief Complaint   Patient presents with    Skin Lesion        I was asked to see this patient by Dr. iPneda ref. provider found. History of Present Illness:  1. Follow-up of multiple issues and total body skin exam    Lichen planus-has noted excellent improvement with clobetasol ointment which he uses as needed for new areas of involvement, and acitretin 25 mg p.o. q. day. Had previously reduced his dose to 25 mg every other day but is now taking this consistently every day. Tolerating acitretin without difficulty. Last lab draw was in January. Knows to limit his alcohol intake. Does have moderate xerosis especially of lips. History of melanoma-has multiple nevi. Have been stable in size, shape, color. Has not noticed any new or changing pigmented lesions. Does monitor his skin for change. Does wear hats and sunscreen    History of nonmelanoma skin cancer-on chemoprevention of acitretin 25 mg every other day for multiple squamous cell carcinomas. Has noted a new keratotic plaque on the vertex of his scalp. It has been present about a month. Has increased in size. Mildly tender. Has also noticed an erythematous papule on his left forearm and right forearm. Nontender. Not growing. Healed well after most recent Mohs surgery 3/19 for basal cell carcinoma right cheek. Has a previous history of widespread squamous cell carcinomas lower legs. These resolved with intralesional 5-fluorouracil and acitretin.     Skin history:  History of metastatic colon cancer currently in remission.  No prior history of sebaceous carcinoma.     History of multiple nonmelanoma skin cancers including multiple squamous cell carcinomas treated previously in his lower legs with curette, Mohs surgery, liquid nitrogen at outside hospital     11/17 right lateral forehead basal cell carcinoma status post Mohs  11/17 left mid helix basal cell carcinoma status post Mohs  1/18 Efudex face and scalp  2/18 malignant melanoma right mid lateral back status post wide local excision by Jannette Christensen on 3/1/18. 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  4/18 right mid lateral back basal cell carcinoma status post curettage  7/18 left dorsal distal forearm nodular basal cell carcinoma treated with curettage  7/18 left thenar wrist well-differentiated squamous cell carcinoma status post curettage  7/18 mid posterior neck superficial basal cell carcinoma status post curettage  11/18 left dorsal hand superficial squamous cell carcinoma status post curettage  11/18 left thenar forearm nodular basal cell carcinoma status post curettage  11/18 right scapular back superficial basal cell carcinoma status post curettage  1/19 left chest superficial squamous cell carcinoma status post curettage  1/19 right lateral supra-brow at Mohs site-actinic keratosis  1/19 right mid cheek superficial basal cell carcinoma status post Mohs 3/19     Acitretin 25 mg every other day chemo prevention- increased to 25 mg p.o. q. day 11/18. 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 These resolved on acitretin and with intralesional 5-fluorouracil.     Lichen planus- clobetasol, intralesional Kenalog, acitretin      Review of Systems:  Constitutional: Reports general sense of well-being   Skin: No new or changing pigmented lesions. No new rashes. Past Medical History, Surgical History, Family History, Medications and Allergies reviewed.     Social History:   Social History     Socioeconomic History    Marital status:      Spouse name: Not on file    Number of children: Not on file    Years of education: Not on file    Highest education level: Not on file   Occupational History    Not on file   Social Needs    Financial resource strain: Not on file    Food insecurity:     Worry: Not on file     Inability: Not on file    Transportation needs:     Medical: Not on file     Non-medical: Not on file   Tobacco Use    Smoking status: Never Smoker    Smokeless tobacco: Never Used   Substance and Sexual Activity    Alcohol use: Yes    Drug use: Not on file    Sexual activity: Not on file   Lifestyle    Physical activity:     Days per week: Not on file     Minutes per session: Not on file    Stress: Not on file   Relationships    Social connections:     Talks on phone: Not on file     Gets together: Not on file     Attends [employer] service: Not on file     Active member of club or organization: Not on file     Attends meetings of clubs or organizations: Not on file     Relationship status: Not on file    Intimate partner violence:     Fear of current or ex partner: Not on file     Emotionally abused: Not on file     Physically abused: Not on file     Forced sexual activity: Not on file   Other Topics Concern    Not on file   Social History Narrative    Not on file       Physical Examination       -General: Well-appearing, NAD  Normal affect. Total body skin exam including scalp, face, neck, chest, abdomen, back, bilateral upper extremities, bilateral lower extremities, ocular conjunctiva, external lips, and nails was performed. Examination normal unless stated below. Underwear area not examined. Scattered on the trunk and extremities are multiple well-defined round and oval symmetric smoothly-bordered uniformly brown macules and papules. Postinflammatory change lower legs-a few scattered erythematous-purple papules consistent with history of lichen planus. Mild xerosis of lips. Diffuse severe photo damage.   Multiple well-healed scars with no sign of recurrence                  Right posterior vertex of scalp 1.2 cm keratotic plaque rule out squamous cell carcinoma  Left ulnar forearm 1.0 cm erythematous plaque rule out basal cell carcinoma  Left scapular tip 7 mm irregular tan papule rule out atypia  Mid spinal back 1.3 cm brown plaque rule out atypia  Right anterior forearm 6 mm pearly pink papule rule out basal cell carcinoma  Left ala 4 mm pink papule rule out basal cell carcinoma      Assessment and Plan     1. Neoplasm of uncertain behavior of skin   Right posterior vertex of scalp 1.2 cm keratotic plaque rule out squamous cell carcinoma  Left ulnar forearm 1.0 cm erythematous plaque rule out basal cell carcinoma  Left scapular tip 7 mm irregular tan papule rule out atypia  Mid spinal back 1.3 cm brown plaque rule out atypia  Right anterior forearm 6 mm pearly pink papule rule out basal cell carcinoma  Left ala 4 mm pink papule rule out basal cell carcinoma    -Discussed possible diagnosis. Patient agreeable to biopsy. Verbal consent obtained after risks (infection, bleeding, scar), benefits and alternatives explained. -Area(s) to be biopsied were marked with a surgical pen. Site(s) were cleansed with alcohol. Local anesthesia achieved with 1% lidocaine with epinephrine/sodium bicarbonate. Shave biopsy performed with a razor blade. Hemostasis was achieved with aluminum chloride. The wound(s) were dressed with petrolatum and covered with a bandage. Specimen(s) sent to pathology. Pt educated re: risk of bleeding, infection, scar and wound care instructions. Note: Biopsy left ala with a small amount of bleeding after aluminum chloride. Offered cautery, which patient denied-did not want more anesthesia. Pressure dressing applied. He will let me know if lesion continues to bleed. Right anterior forearm, left ulnar forearm, right posterior vertex of scalp only-  Curettage performed after informed written consent obtained following explanation of risks, benefits, alternatives  -Local anesthesia acheived with 1% lidocaine with epinephrine/sodium bicarbonate.  Sharp curettage performed in 3 different directions. First pass size right posterior vertex of scalp 13 mm, left ulnar forearm 11 mm, right anterior forearm 6 mm Hemostasis obtained with aluminum chloride.   -Edu re: bleeding, discomfort, infection, scar  -Edu re: wound care, risk of recurrence  Discussed risk of recurrence especially right posterior vertex of scalp-Mohs if lesion recurs  Recheck 6-8 weeks at follow-up     2. Benign nevus - Benign acquired melanocytic nevi  -Recommend monthly self skin exams   -Educated regarding the ABCDEs of melanoma detection   -Call for any new/changing moles or concerning lesions  -Reviewed sun protective behavior -- sun avoidance during the peak hours of the day, sun-protective clothing (including hat and sunglasses), sunscreen use (water resistant, broad spectrum, SPF at least 30, need for reapplication every 2 to 3 hours), avoidance of tanning beds \   3. Seborrheic keratoses -monitor for change    4. Lichen planus -continue acitretin 25 mg p.o. q. day. Clobetasol as needed    5. High risk medication use -continue acitretin 25 mg p.o. q. day. Due for labs at next visit. Continue to moderate alcohol intake    6. History of malignant melanoma - No evidence of recurrence. Discussed risk of subsequent skin cancers and increased risk of melanoma. Reviewed importance of monitoring skin for change and sun protection with hats and sunscreen, as well as sun avoidance. 7. History of nonmelanoma skin cancer - No evidence of recurrence. Discussed risk of subsequent skin cancers and increased risk of melanoma. Reviewed importance of monitoring skin for change and sun protection with hats and sunscreen, as well as sun avoidance. Pathology report:  DIAGNOSIS:   A.  Left scapular tip-   Dysplastic nevus with moderate dysplasia, completely   excised   There is proliferation of focally atypical melanocytes at   the junction, singly and in nests that focally bridge   between ridges.  The underlying dermis shows fibroplasia   and patchy lymphocytic infiltration.  The dysplasia is   moderate.  The changes are occurring within a junction   nevus.  The lesion is completely excised. -Monitor for recurrence   B. Mid Spinal Back-   Basosquamous acanthoma, inflamed   An epidermal hypertrophy composed of reticulations of   mature polygonal and basaloid cells enclosing multiple   variably sized horn cysts.  There is no suggestion of   malignancy.  The lesion is chronically inflamed. -Benign, no further therapy   C. Right anterior forearm-   Basal Cell Carcinoma, Nodular Type   Within the dermis there are variably sized nests of basal   cell carcinoma. - Treated with curettage at the time of biopsy, monitor for recurrence   D. Left ala-   Actinic keratosis, hypertrophic (see description)   There is a proliferation of atypical keratinocytes along   the basal layer.  There is overlying hyperparakeratosis. The lesion is focally transected at the base and underlying   invasion cannot be ruled out with certainty. COMMENT: Multiple deeper cuts were done. Dr. Selena Ann also   reviewed this case and concurs with the diagnosis. - Recheck at follow-up   E. RIght post vertex-   Squamous cell carcinoma, moderately differentiated   There are atypical keratinocytes with moderate pleomorphism   proliferating within the epithelium.  These cells are   extending into the underlying dermis as irregularly shaped   islands. -Treated with curettage at the time of biopsy. Recheck at scheduled follow-up. Mohs if lesion recurs   F. Left ulnar forearm-   Squamous cell carcinoma, moderately differentiated   There are atypical keratinocytes with moderate pleomorphism   proliferating within the epithelium.  These cells are   extending into the underlying dermis as irregularly shaped   islands. -Treated with curettage at time of biopsy, monitor for recurrence   COMMENT: Multiple deeper cuts were done. 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  Dr. Selena Ann also reviewed this case   and concurs with the diagnosis.

## 2019-04-16 LAB — DERMATOLOGY PATHOLOGY REPORT: ABNORMAL

## 2019-04-17 ENCOUNTER — TELEPHONE (OUTPATIENT)
Dept: DERMATOLOGY | Age: 79
End: 2019-04-17

## 2019-04-17 NOTE — TELEPHONE ENCOUNTER
----- Message from Roberto Hernandez MD sent at 4/17/2019  9:48 AM EDT -----  A-monitor for recurrence  Be-benign  C-treated with curettage at the time of biopsy, monitor for recurrence  D-monitor for recurrence, treat with liquid nitrogen at follow-up if needed  E-treated with curettage at the time of biopsy. Recheck at follow-up.   Mohs if lesion recurs  Asked-treated with curettage at the time of biopsy, monitor for recurrence

## 2019-05-21 ENCOUNTER — TELEPHONE (OUTPATIENT)
Dept: DERMATOLOGY | Age: 79
End: 2019-05-21

## 2019-05-21 NOTE — TELEPHONE ENCOUNTER
Called and LM for pt to call the office. Per Dr. Katherine Lora please change pt f/u appointment to 5/23/19 if patient is available.

## 2019-06-05 ENCOUNTER — OFFICE VISIT (OUTPATIENT)
Dept: DERMATOLOGY | Age: 79
End: 2019-06-05
Payer: MEDICARE

## 2019-06-05 DIAGNOSIS — Z79.899 HIGH RISK MEDICATION USE: ICD-10-CM

## 2019-06-05 DIAGNOSIS — Z85.828 HISTORY OF NONMELANOMA SKIN CANCER: ICD-10-CM

## 2019-06-05 DIAGNOSIS — C44.612 BASAL CELL CARCINOMA (BCC) OF RIGHT SHOULDER: ICD-10-CM

## 2019-06-05 DIAGNOSIS — D48.5 NEOPLASM OF UNCERTAIN BEHAVIOR OF SKIN: Primary | ICD-10-CM

## 2019-06-05 DIAGNOSIS — L57.0 KERATOSIS, ACTINIC: ICD-10-CM

## 2019-06-05 DIAGNOSIS — Z85.820 HISTORY OF MALIGNANT MELANOMA: ICD-10-CM

## 2019-06-05 DIAGNOSIS — C44.41 BASAL CELL CARCINOMA (BCC) OF NECK: ICD-10-CM

## 2019-06-05 DIAGNOSIS — D22.9 BENIGN NEVUS: ICD-10-CM

## 2019-06-05 DIAGNOSIS — L43.9 LICHEN PLANUS: ICD-10-CM

## 2019-06-05 PROCEDURE — 11105 PUNCH BX SKIN EA SEP/ADDL: CPT | Performed by: DERMATOLOGY

## 2019-06-05 PROCEDURE — 17261 DSTRJ MAL LES T/A/L .6-1.0CM: CPT | Performed by: DERMATOLOGY

## 2019-06-05 PROCEDURE — 17000 DESTRUCT PREMALG LESION: CPT | Performed by: DERMATOLOGY

## 2019-06-05 PROCEDURE — 17271 DSTR MAL LES S/N/H/F/G 0.6-1: CPT | Performed by: DERMATOLOGY

## 2019-06-05 PROCEDURE — 11102 TANGNTL BX SKIN SINGLE LES: CPT | Performed by: DERMATOLOGY

## 2019-06-05 PROCEDURE — 99214 OFFICE O/P EST MOD 30 MIN: CPT | Performed by: DERMATOLOGY

## 2019-06-05 PROCEDURE — 17003 DESTRUCT PREMALG LES 2-14: CPT | Performed by: DERMATOLOGY

## 2019-06-05 PROCEDURE — 11104 PUNCH BX SKIN SINGLE LESION: CPT | Performed by: DERMATOLOGY

## 2019-06-05 NOTE — PROGRESS NOTES
[facility]) Dermatology  Rosalio Krueger M.D.  [phone]       Dang Craigter  1940    77 y.o. male     Date of Visit: 6/5/2019    Chief Complaint:   Chief Complaint   Patient presents with    Skin Lesion     fbse          I was asked to see this patient by Dr. Pineda ref. provider found. History of Present Illness:  1. Total body skin exam    Multiple nevi, history of malignant melanoma ×2 treated with wide local excision-stable in size, shape, color. Patient has not noticed any new or changing pigmented lesions. Does monitor his skin for change. Does wear hats and sunscreen    Actinic keratoses-status post Efudex 5% cream b.i.d. for 2 weeks to his entire face and scalp completed in January 2018 with excellent improvement. Has developed a recurrence of multiple actinic keratoses on his cheeks. Lesions are relatively small and discrete. History of multiple squamous cell carcinomas lower legs initially treated with intralesional 5-fluorouracil and maintained on acitretin 25 mg p.o. q. day. Patient has continued this dose although in the last few days has tried to reduce back down to 25 mg every other day. Lichen planus- maintained on acitretin 25 mg p.o. q. day. Also uses clobetasol ointment b.i.d. for flares. Has noted some good improvement using clobetasol when he flares. Acitretin-as tolerated without difficulty. Continues to drink alcohol although aware that he needs to show moderation. Liver function tests stable and last checked 4/19. Has had some xerosis of his lips. Cholesterol has been well controlled-last checked January 2019    History of nonmelanoma skin cancer-multiple nonmelanoma skin cancers treated with curettage at his last visit. Most concerning lesion was a moderately differentiated squamous cell carcinoma on his right posterior vertex of scalp. Patient wanted to avoid Mohs-lesion was treated with curettage. States that it healed well.       Skin history:  History of and Allergies reviewed. Social History:   Social History     Socioeconomic History    Marital status:      Spouse name: Not on file    Number of children: Not on file    Years of education: Not on file    Highest education level: Not on file   Occupational History    Not on file   Social Needs    Financial resource strain: Not on file    Food insecurity:     Worry: Not on file     Inability: Not on file    Transportation needs:     Medical: Not on file     Non-medical: Not on file   Tobacco Use    Smoking status: Never Smoker    Smokeless tobacco: Never Used   Substance and Sexual Activity    Alcohol use: Yes    Drug use: Not on file    Sexual activity: Not on file   Lifestyle    Physical activity:     Days per week: Not on file     Minutes per session: Not on file    Stress: Not on file   Relationships    Social connections:     Talks on phone: Not on file     Gets together: Not on file     Attends Holiness service: Not on file     Active member of club or organization: Not on file     Attends meetings of clubs or organizations: Not on file     Relationship status: Not on file    Intimate partner violence:     Fear of current or ex partner: Not on file     Emotionally abused: Not on file     Physically abused: Not on file     Forced sexual activity: Not on file   Other Topics Concern    Not on file   Social History Narrative    Not on file       Physical Examination       -General: Well-appearing, NAD  Normal affect. Total body skin exam including scalp, face, neck, chest, abdomen, back, bilateral upper extremities, bilateral lower extremities, ocular conjunctiva, external lips, and nails was performed. Examination normal unless stated below. Underwear area not examined. Scattered on the trunk and extremities are multiple well-defined round and oval symmetric smoothly-bordered uniformly brown macules and papules.   Multiple gritty erythematous papules cheeks  Multiple erythematous slightly scaly papules over his arms and legs, lichen planus. Left medial lower leg 6 monitor pink papule rule out nonmelanoma skin cancer  Right posterior shoulder 6 mm pink papule rule out nonmelanoma skin cancer  Right posterior vertex of scalp-previously treated with curettage-6 mm papule with central depressed scar-rule out squamous cell carcinoma  Mid posterior scalp 1.0 cm mobile nodule-cyst, rule out squamous cell carcinoma  Left posterior neck 6 mm pink papule rule out basal cell carcinoma        Assessment and Plan     1. Neoplasm of uncertain behavior of skin   Left medial lower leg 6 monitor pink papule rule out nonmelanoma skin cancer  Right posterior shoulder 6 mm pink papule rule out nonmelanoma skin cancer  Left posterior neck 6 mm pink papule rule out basal cell carcinoma    -Discussed possible diagnosis. Patient agreeable to biopsy. Verbal consent obtained after risks (infection, bleeding, scar), benefits and alternatives explained. -Area(s) to be biopsied were marked with a surgical pen. Site(s) were cleansed with alcohol. Local anesthesia achieved with 1% lidocaine with epinephrine/sodium bicarbonate. Shave biopsy performed with a razor blade. Hemostasis was achieved with aluminum chloride. The wound(s) were dressed with petrolatum and covered with a bandage. Specimen(s) sent to pathology. Pt educated re: risk of bleeding, infection, scar and wound care instructions. Right posterior shoulder and left posterior neck only:    Curettage performed after informed written consent obtained following explanation of risks, benefits, alternatives  -Local anesthesia acheived with 1% lidocaine with epinephrine/sodium bicarbonate. Sharp curettage performed in 3 different directions. First pass size 8mm each lesion.  Hemostasis obtained with aluminum chloride.   -Edu re: bleeding, discomfort, infection, scar  -Edu re: wound care, risk of recurrence          Right posterior vertex of scalp-previously treated with curettage-6 mm papule with central depressed scar-rule out squamous cell carcinoma  Mid posterior scalp 1.0 cm mobile nodule-cyst, rule out squamous cell carcinoma    -Discussed possible diagnosis. Patient agreeable to biopsy. Verbal consent obtained after risks (infection, bleeding, scar), benefits and alternatives explained. -Area(s) to be biopsied were marked with a surgical pen. Site(s) were cleansed with alcohol. Local anesthesia achieved with 1% lidocaine with epinephrine/sodium bicarbonate. 4mm punch biopsy of each lesion-right posterior vertex of scalp and mid posterior scalp- was performed followed by placement of simple interrupted 4-0 nylon sutures. Specimen(s) sent to pathology. The wound(s) were dressed with petrolatum and covered with a bandage. Pt was educated re: risk of bleeding, infection, scar, wound care instructions and suture removal.              2. Keratosis, actinic - 7, cheeks lesion(s) treated w/ liquid nitrogen. Edu re: risk of blister formation, discomfort, scar, hypopigmentation. Discussed wound care. 3. Lichen planus -clobetasol ointment b.i.d. p.r.n. flares    4. High risk medication use -acitretin 25 mg p.o. q. day for both chemoprevention and treatment of lichen planus. Reviewed side effects and contraindications. Patient would like to try reducing his dose to 25 mg every other day in the future. Recommended he maintain his current dose through the summer and we can reevaluate in the fall. In the past, when we have reduced his dose he has flared.      5. Benign nevus - Benign acquired melanocytic nevi  -Recommend monthly self skin exams   -Educated regarding the ABCDEs of melanoma detection   -Call for any new/changing moles or concerning lesions  -Reviewed sun protective behavior -- sun avoidance during the peak hours of the day, sun-protective clothing (including hat and sunglasses), sunscreen use (water resistant, broad spectrum, SPF at least 30, need for reapplication every 2 to 3 hours), avoidance of tanning beds      6. History of malignant melanoma - No evidence of recurrence. Discussed risk of subsequent skin cancers and increased risk of melanoma. Reviewed importance of monitoring skin for change and sun protection with hats and sunscreen, as well as sun avoidance. 7. History of nonmelanoma skin cancer - No evidence of recurrence. Discussed risk of subsequent skin cancers and increased risk of melanoma. Reviewed importance of monitoring skin for change and sun protection with hats and sunscreen, as well as sun avoidance. DIAGNOSIS:   A. Left medial lower leg-   Lichenoid keratosis   The epidermis is focally thinned with overlying   parakeratosis and scattered colloid bodies. Gerlean Alpine is an   underlying bandlike mononuclear infiltrate.  There is no   cytologic atypia. B. Right posterior shoulder-   Basal Cell Carcinoma, Superficial Type   Multiple, apparently discrete nests of basal cell carcinoma   arise from the basal layer of the epidermis and are   spreading laterally and slightly downward into the   superficial dermis. C. Right posterior vertex scalp-   No residual Squamous Cell Carcinoma within the re-excised   tissue   There is cicatricial fibrosis from the prior biopsy. However, there is no evidence of residual squamous cell   carcinoma in the additionally excised tissue. D. Mid posterior scalp-   Actinic keratosis, hypertrophic   There is a proliferation of atypical keratinocytes along   the basal layer.  There is overlying hyperparakeratosis. The dermis shows elastosis and chronic inflammatory   reaction but there is no invasive malignancy. E. Left posterior neck-   Basal Cell Carcinoma, Nodular Type   Within the dermis there are variably sized nests of basal   cell carcinoma.  In addition, there is a superficial   component. Left neck and right post shoulder both treated with curette at biopsy.

## 2019-06-10 LAB — DERMATOLOGY PATHOLOGY REPORT: ABNORMAL

## 2019-06-20 ENCOUNTER — NURSE ONLY (OUTPATIENT)
Dept: DERMATOLOGY | Age: 79
End: 2019-06-20

## 2019-06-20 DIAGNOSIS — Z48.02 ENCOUNTER FOR REMOVAL OF SUTURES: Primary | ICD-10-CM

## 2019-06-20 PROCEDURE — 99024 POSTOP FOLLOW-UP VISIT: CPT | Performed by: DERMATOLOGY

## 2019-06-27 NOTE — PROGRESS NOTES
Patient presents for suture removal. Patient is one week overdue for suture removal on scalp. The wound is well healed without signs of infection. The sutures are removed. Wound care and activity instructions given. Return prn.

## 2019-09-18 ENCOUNTER — OFFICE VISIT (OUTPATIENT)
Dept: DERMATOLOGY | Age: 79
End: 2019-09-18
Payer: MEDICARE

## 2019-09-18 DIAGNOSIS — C44.612 BASAL CELL CARCINOMA (BCC) OF RIGHT SHOULDER: ICD-10-CM

## 2019-09-18 DIAGNOSIS — Z85.828 HISTORY OF NONMELANOMA SKIN CANCER: ICD-10-CM

## 2019-09-18 DIAGNOSIS — Z85.820 HISTORY OF MALIGNANT MELANOMA: ICD-10-CM

## 2019-09-18 DIAGNOSIS — D48.5 NEOPLASM OF UNCERTAIN BEHAVIOR OF SKIN: ICD-10-CM

## 2019-09-18 DIAGNOSIS — L43.9 LICHEN PLANUS: ICD-10-CM

## 2019-09-18 DIAGNOSIS — L57.0 KERATOSIS, ACTINIC: Primary | ICD-10-CM

## 2019-09-18 DIAGNOSIS — Z79.899 HIGH RISK MEDICATION USE: ICD-10-CM

## 2019-09-18 DIAGNOSIS — D22.9 BENIGN NEVUS: ICD-10-CM

## 2019-09-18 PROCEDURE — 17003 DESTRUCT PREMALG LES 2-14: CPT | Performed by: DERMATOLOGY

## 2019-09-18 PROCEDURE — 99214 OFFICE O/P EST MOD 30 MIN: CPT | Performed by: DERMATOLOGY

## 2019-09-18 PROCEDURE — 17000 DESTRUCT PREMALG LESION: CPT | Performed by: DERMATOLOGY

## 2019-09-18 PROCEDURE — 11102 TANGNTL BX SKIN SINGLE LES: CPT | Performed by: DERMATOLOGY

## 2019-09-18 PROCEDURE — 17261 DSTRJ MAL LES T/A/L .6-1.0CM: CPT | Performed by: DERMATOLOGY

## 2019-09-18 RX ORDER — ACITRETIN 25 MG/1
CAPSULE ORAL
Qty: 90 CAPSULE | Refills: 1 | Status: SHIPPED | OUTPATIENT
Start: 2019-09-18 | End: 2020-01-29 | Stop reason: SDUPTHER

## 2019-09-18 RX ORDER — UBIDECARENONE 10 MG
CAPSULE ORAL
COMMUNITY

## 2019-09-18 RX ORDER — ACETAMINOPHEN 325 MG/1
650 TABLET ORAL EVERY 6 HOURS PRN
COMMUNITY

## 2019-09-18 RX ORDER — OMEPRAZOLE 20 MG/1
CAPSULE, DELAYED RELEASE ORAL
COMMUNITY

## 2019-09-18 RX ORDER — FERROUS SULFATE 325(65) MG
325 TABLET ORAL
COMMUNITY
End: 2022-05-12

## 2019-09-23 LAB — DERMATOLOGY PATHOLOGY REPORT: ABNORMAL

## 2019-11-21 ENCOUNTER — OFFICE VISIT (OUTPATIENT)
Dept: DERMATOLOGY | Age: 79
End: 2019-11-21
Payer: MEDICARE

## 2019-11-21 DIAGNOSIS — L43.9 LICHEN PLANUS: ICD-10-CM

## 2019-11-21 DIAGNOSIS — Z85.820 HISTORY OF MALIGNANT MELANOMA: ICD-10-CM

## 2019-11-21 DIAGNOSIS — L57.0 KERATOSIS, ACTINIC: ICD-10-CM

## 2019-11-21 DIAGNOSIS — D48.5 NEOPLASM OF UNCERTAIN BEHAVIOR OF SKIN: Primary | ICD-10-CM

## 2019-11-21 DIAGNOSIS — Z85.828 HISTORY OF NONMELANOMA SKIN CANCER: ICD-10-CM

## 2019-11-21 DIAGNOSIS — Z79.899 HIGH RISK MEDICATION USE: ICD-10-CM

## 2019-11-21 DIAGNOSIS — D22.9 BENIGN NEVUS: ICD-10-CM

## 2019-11-21 PROCEDURE — 11102 TANGNTL BX SKIN SINGLE LES: CPT | Performed by: DERMATOLOGY

## 2019-11-21 PROCEDURE — 17003 DESTRUCT PREMALG LES 2-14: CPT | Performed by: DERMATOLOGY

## 2019-11-21 PROCEDURE — 99214 OFFICE O/P EST MOD 30 MIN: CPT | Performed by: DERMATOLOGY

## 2019-11-21 PROCEDURE — 11103 TANGNTL BX SKIN EA SEP/ADDL: CPT | Performed by: DERMATOLOGY

## 2019-11-21 PROCEDURE — 17000 DESTRUCT PREMALG LESION: CPT | Performed by: DERMATOLOGY

## 2019-11-25 ENCOUNTER — TELEPHONE (OUTPATIENT)
Dept: DERMATOLOGY | Age: 79
End: 2019-11-25

## 2019-11-25 LAB — DERMATOLOGY PATHOLOGY REPORT: NORMAL

## 2020-01-29 ENCOUNTER — OFFICE VISIT (OUTPATIENT)
Dept: DERMATOLOGY | Age: 80
End: 2020-01-29
Payer: MEDICARE

## 2020-01-29 PROCEDURE — 99214 OFFICE O/P EST MOD 30 MIN: CPT | Performed by: DERMATOLOGY

## 2020-01-29 RX ORDER — FLUOROURACIL 50 MG/G
CREAM TOPICAL
Qty: 40 G | Refills: 0 | Status: SHIPPED | OUTPATIENT
Start: 2020-01-29 | End: 2020-12-17 | Stop reason: SDUPTHER

## 2020-01-29 RX ORDER — ACITRETIN 25 MG/1
CAPSULE ORAL
Qty: 90 CAPSULE | Refills: 1 | Status: SHIPPED | OUTPATIENT
Start: 2020-01-29 | End: 2021-04-13

## 2020-01-29 NOTE — PROGRESS NOTES
basal cell carcinoma status post curettage     Acitretin 25 mg every other day chemo prevention- increased to 25 mg p.o. q. day 11/18.  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  These resolved on acitretin and with intralesional 5-fluorouracil.     Lichen planus- clobetasol, intralesional Kenalog, acitretin     Review of Systems:  Constitutional: Reports general sense of well-being   No new rashes or changing pigmented lesions    Past Medical History, Surgical History, Family History, Medications and Allergies reviewed. Social History:   Social History     Socioeconomic History    Marital status:      Spouse name: Not on file    Number of children: Not on file    Years of education: Not on file    Highest education level: Not on file   Occupational History    Not on file   Social Needs    Financial resource strain: Not on file    Food insecurity:     Worry: Not on file     Inability: Not on file    Transportation needs:     Medical: Not on file     Non-medical: Not on file   Tobacco Use    Smoking status: Never Smoker    Smokeless tobacco: Never Used   Substance and Sexual Activity    Alcohol use:  Yes    Drug use: Not on file    Sexual activity: Not on file   Lifestyle    Physical activity:     Days per week: Not on file     Minutes per session: Not on file    Stress: Not on file   Relationships    Social connections:     Talks on phone: Not on file     Gets together: Not on file     Attends Anabaptist service: Not on file     Active member of club or organization: Not on file     Attends meetings of clubs or organizations: Not on file     Relationship status: Not on file    Intimate partner violence:     Fear of current or ex partner: Not on file     Emotionally abused: Not on file     Physically abused: Not on file     Forced sexual activity: Not on file   Other Topics Concern    Not on file   Social History Narrative    Not

## 2020-06-18 ENCOUNTER — OFFICE VISIT (OUTPATIENT)
Dept: DERMATOLOGY | Age: 80
End: 2020-06-18
Payer: MEDICARE

## 2020-06-18 VITALS — TEMPERATURE: 97.3 F

## 2020-06-18 PROCEDURE — 99214 OFFICE O/P EST MOD 30 MIN: CPT | Performed by: DERMATOLOGY

## 2020-06-18 PROCEDURE — 69100 BIOPSY OF EXTERNAL EAR: CPT | Performed by: DERMATOLOGY

## 2020-06-18 PROCEDURE — 17261 DSTRJ MAL LES T/A/L .6-1.0CM: CPT | Performed by: DERMATOLOGY

## 2020-06-18 PROCEDURE — 11102 TANGNTL BX SKIN SINGLE LES: CPT | Performed by: DERMATOLOGY

## 2020-06-18 PROCEDURE — 17003 DESTRUCT PREMALG LES 2-14: CPT | Performed by: DERMATOLOGY

## 2020-06-18 PROCEDURE — 11103 TANGNTL BX SKIN EA SEP/ADDL: CPT | Performed by: DERMATOLOGY

## 2020-06-18 PROCEDURE — 17000 DESTRUCT PREMALG LESION: CPT | Performed by: DERMATOLOGY

## 2020-06-18 NOTE — PROGRESS NOTES
intralesional Kenalog, acitretin       Review of Systems:  Constitutional: Reports general sense of well-being   No new rashes or changing pigmented lesions    Past Medical History, Surgical History, Family History, Medications and Allergies reviewed. Social History:   Social History     Socioeconomic History    Marital status:      Spouse name: Not on file    Number of children: Not on file    Years of education: Not on file    Highest education level: Not on file   Occupational History    Not on file   Social Needs    Financial resource strain: Not on file    Food insecurity     Worry: Not on file     Inability: Not on file    Transportation needs     Medical: Not on file     Non-medical: Not on file   Tobacco Use    Smoking status: Never Smoker    Smokeless tobacco: Never Used   Substance and Sexual Activity    Alcohol use: Yes    Drug use: Not on file    Sexual activity: Not on file   Lifestyle    Physical activity     Days per week: Not on file     Minutes per session: Not on file    Stress: Not on file   Relationships    Social connections     Talks on phone: Not on file     Gets together: Not on file     Attends Denominational service: Not on file     Active member of club or organization: Not on file     Attends meetings of clubs or organizations: Not on file     Relationship status: Not on file    Intimate partner violence     Fear of current or ex partner: Not on file     Emotionally abused: Not on file     Physically abused: Not on file     Forced sexual activity: Not on file   Other Topics Concern    Not on file   Social History Narrative    Not on file       Physical Examination       -General: Well-appearing, NAD  1. Normal affect. Total body skin exam including scalp, face, neck, chest, abdomen, back, bilateral upper extremities, bilateral lower extremities, ocular conjunctiva, external lips, and nails was performed. Examination normal unless stated below.   Underwear area not

## 2020-06-22 LAB — DERMATOLOGY PATHOLOGY REPORT: ABNORMAL

## 2020-06-23 ENCOUNTER — TELEPHONE (OUTPATIENT)
Dept: DERMATOLOGY | Age: 80
End: 2020-06-23

## 2020-12-17 ENCOUNTER — OFFICE VISIT (OUTPATIENT)
Dept: DERMATOLOGY | Age: 80
End: 2020-12-17
Payer: MEDICARE

## 2020-12-17 VITALS — TEMPERATURE: 97.9 F

## 2020-12-17 PROCEDURE — 17003 DESTRUCT PREMALG LES 2-14: CPT | Performed by: DERMATOLOGY

## 2020-12-17 PROCEDURE — 17261 DSTRJ MAL LES T/A/L .6-1.0CM: CPT | Performed by: DERMATOLOGY

## 2020-12-17 PROCEDURE — 11102 TANGNTL BX SKIN SINGLE LES: CPT | Performed by: DERMATOLOGY

## 2020-12-17 PROCEDURE — 17262 DSTRJ MAL LES T/A/L 1.1-2.0: CPT | Performed by: DERMATOLOGY

## 2020-12-17 PROCEDURE — 69100 BIOPSY OF EXTERNAL EAR: CPT | Performed by: DERMATOLOGY

## 2020-12-17 PROCEDURE — 99214 OFFICE O/P EST MOD 30 MIN: CPT | Performed by: DERMATOLOGY

## 2020-12-17 PROCEDURE — 11103 TANGNTL BX SKIN EA SEP/ADDL: CPT | Performed by: DERMATOLOGY

## 2020-12-17 PROCEDURE — 17000 DESTRUCT PREMALG LESION: CPT | Performed by: DERMATOLOGY

## 2020-12-17 RX ORDER — FLUOROURACIL 50 MG/G
CREAM TOPICAL
Qty: 40 G | Refills: 0 | Status: SHIPPED | OUTPATIENT
Start: 2020-12-17 | End: 2022-08-11 | Stop reason: ALTCHOICE

## 2020-12-17 NOTE — PROGRESS NOTES
[facility]) Dermatology  Dieudonne Sylvester M.D.  [phone]       Tristan Plascencia  1940    79 y.o. male     Date of Visit: 12/17/2020    Chief Complaint:   Chief Complaint   Patient presents with    Skin Exam        I was asked to see this patient by Dr. Pineda ref. provider found. History of Present Illness:  1. Total-body skin exam-had to reschedule his last appointment as he was having back pain    Remains on acitretin 25 mg for chemoprevention-reduced dose down to 25 mg every other day when he was taking Tylenol for his back pain, has not increased back to every day despite not currently taking Tylenol. Notes an erythematous scaly papule left supra brow and right anterior forearm. Raised, scaly-nontender. Lichen planus-has not noticed any new lesions. Does have clobetasol at home if he develops a new lesion on his legs that he can treat. Multiple nevi. Stable in size, shape, color. Has not noticed any new or changing pigmented lesions. AK- multiple dry papules scalp, neck, face-last completed Efudex January 2020. Has had a recurrence of multiple actinic keratoses. Did not bring his hearing aids today. Very [place], moshe with masking. Newly diagnosed with polycythemia-treating with phlebotomy    Skin history:  History of metastatic colon cancer currently in remission.  No prior history of sebaceous carcinoma.     History of multiple nonmelanoma skin cancers including multiple squamous cell carcinomas treated previously in his lower legs with curette, Mohs surgery, liquid nitrogen at outside hospital     11/17 right lateral forehead basal cell carcinoma status post Mohs  11/17 left mid helix basal cell carcinoma status post Mohs  1/18 Efudex face and scalp  2/18 malignant melanoma right mid lateral back status post wide local excision by Chad Bautista on 3/1/18.  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  4/18 right mid lateral back basal cell carcinoma status post curettage  7/18 left dorsal distal forearm nodular basal cell carcinoma treated with curettage  7/18 left thenar wrist well-differentiated squamous cell carcinoma status post curettage  7/18 mid posterior neck superficial basal cell carcinoma status post curettage  9/18 left mid thenar forearm superficial basal cell carcinoma status post curettage  9/18 left chest superficial basal cell carcinoma status post curettage  9/18 right lower back superficial basal cell carcinoma status post curettage  9/18 right lower back inferior superficial basal cell carcinoma status post curettage  11/18 left dorsal hand superficial squamous cell carcinoma status post curettage  11/18 left thenar forearm nodular basal cell carcinoma status post curettage  11/18 right scapular back superficial basal cell carcinoma status post curettage  1/19 left chest superficial squamous cell carcinoma status post curettage  1/19 right lateral supra-brow at Mohs site-actinic keratosis  1/19 right mid cheek superficial basal cell carcinoma status post Mohs 3/19  4/19 left scapular tip dysplastic nevus with moderate dysplasia, completely excised  4/19 right anterior forearm nodular basal cell carcinoma status post curettage  4/19 left ala hypertrophic actinic keratosis  4/19 left ulnar forearm squamous cell carcinoma status post curettage  4/19 right posterior vertex moderately differentiated squamous cell carcinoma status post curettage-punch biopsy 6/19 with no evidence of recurrence  6/19 right posterior shoulder superficial basal cell carcinoma status post curettage  6/19 left posterior neck nodular basal cell carcinoma status post curettage  9/19 right posterior shoulder nodular basal cell carcinoma status post curettage  6/20 right proximal shoulder superficial basal cell carcinoma status post curettage  6/20 left anterior shoulder actinic keratosis status post curettage  6/20 left lateral earlobe nodular basal cell carcinoma status post Mohs     Acitretin 25 mg every other day chemo prevention- increased to 25 mg p.o. q. day 11/18.  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  These resolved on acitretin and with intralesional 5-fluorouracil.     Lichen planus- clobetasol, intralesional Kenalog, acitretin        Review of Systems:  Constitutional: Reports general sense of well-being       Past Medical History, Surgical History, Family History, Medications and Allergies reviewed. Social History:   Social History     Socioeconomic History    Marital status:      Spouse name: Not on file    Number of children: Not on file    Years of education: Not on file    Highest education level: Not on file   Occupational History    Not on file   Social Needs    Financial resource strain: Not on file    Food insecurity     Worry: Not on file     Inability: Not on file    Transportation needs     Medical: Not on file     Non-medical: Not on file   Tobacco Use    Smoking status: Never Smoker    Smokeless tobacco: Never Used   Substance and Sexual Activity    Alcohol use:  Yes    Drug use: Not on file    Sexual activity: Not on file   Lifestyle    Physical activity     Days per week: Not on file     Minutes per session: Not on file    Stress: Not on file   Relationships    Social connections     Talks on phone: Not on file     Gets together: Not on file     Attends [employer] service: Not on file     Active member of club or organization: Not on file     Attends meetings of clubs or organizations: Not on file     Relationship status: Not on file    Intimate partner violence     Fear of current or ex partner: Not on file     Emotionally abused: Not on file     Physically abused: Not on file     Forced sexual activity: Not on file   Other Topics Concern    Not on file   Social History Narrative    Not on file       Physical Examination       -General: Well-appearing, NAD  1. Normal affect. Total body skin exam including scalp, face, neck, chest, abdomen, back, bilateral upper extremities, bilateral lower extremities, ocular conjunctiva, external lips, and nails was performed. Examination normal unless stated below. Underwear area not examined. Scattered on the trunk and extremities are multiple well-defined round and oval symmetric smoothly-bordered uniformly brown macules and papules. Multiple well-healed scars no sign of recurrence  Scattered gritty erythematous papules and plaques scalp, neck, ears, dorsal hands, dorsal forearms. Multiple well-healed scars no sign of recurrent  No active lichen planus                          Left mid supra brow 7 mm erythematous scaly papule rule out squamous cell carcinoma  Right helix 6 mm hypertrophic actinic keratosis versus squamous cell carcinoma  Right cheek above mandible 1.0 cm erythematous plaque rule out basal cell carcinoma  Right anterior forearm 7 mm erythematous scaly papule rule out squamous cell carcinoma  Left lower back 9 mm erythematous papule rule out basal cell carcinoma  Right mid paraspinal back 8 mm erythematous papule rule out nonmelanoma skin cancer  LFT, basic, CBC 12/20    Assessment and Plan     1. Neoplasm of uncertain behavior of skin   Left mid supra brow 7 mm erythematous scaly papule rule out squamous cell carcinoma  Right helix 6 mm hypertrophic actinic keratosis versus squamous cell carcinoma  Right cheek above mandible 1.0 cm erythematous plaque rule out basal cell carcinoma  Right anterior forearm 7 mm erythematous scaly papule rule out squamous cell carcinoma  Left lower back 9 mm erythematous papule rule out basal cell carcinoma  Right mid paraspinal back 8 mm erythematous papule rule out nonmelanoma skin cancer    -Discussed possible diagnosis. Patient agreeable to biopsy. Verbal consent obtained after risks (infection, bleeding, scar), benefits and alternatives explained. -Area(s) to be biopsied were marked with a surgical pen. Site(s) were cleansed with alcohol. Local anesthesia achieved with 1% lidocaine with epinephrine/sodium bicarbonate. Shave biopsy performed with a razor blade. Hemostasis was achieved with aluminum chloride. The wound(s) were dressed with petrolatum and covered with a bandage. Specimen(s) sent to pathology. Pt educated re: risk of bleeding, infection, scar and wound care instructions. Left lower back, right mid paraspinal back, right anterior forearm only-  Curettage performed after informed written consent obtained following explanation of risks, benefits, alternatives  -Local anesthesia acheived with 1% lidocaine with epinephrine/sodium bicarbonate. Sharp curettage performed in 3 different directions. First pass size right anterior forearm 9 mm, left lower back 13 mm, right mid paraspinal back 13 mm. Hemostasis obtained with aluminum chloride.   -Edu re: bleeding, discomfort, infection, scar  -Edu re: wound care, risk of recurrence       2. Benign nevus - Benign acquired melanocytic nevi  -Recommend monthly self skin exams   -Educated regarding the ABCDEs of melanoma detection   -Call for any new/changing moles or concerning lesions  -Reviewed sun protective behavior -- sun avoidance during the peak hours of the day, sun-protective clothing (including hat and sunglasses), sunscreen use (water resistant, broad spectrum, SPF at least 30, need for reapplication every 2 to 3 hours), avoidance of tanning beds      3. Keratosis, actinic -6-scalp, face, neck, dorsal hands and forearms lesion(s) treated w/ liquid nitrogen. Edu re: risk of blister formation, discomfort, scar, hypopigmentation. Discussed wound care. Efudex 5% cream twice daily for 2 weeks scalp, forehead, temples, cheeks, top of nose, top of ears, upper neck. Reviewed side effects, contraindications, proper application     4.  Lichen planus-no treatment for now-patient does have clobetasol at home that he can use twice daily for new lesions   5. High risk medication use -increase to acitretin 25 mg p.o. daily. Limit alcohol. Continue to monitor LFTs, triglycerides   6. History of malignant melanoma - No evidence of recurrence. Discussed risk of subsequent skin cancers and increased risk of melanoma. Reviewed importance of monitoring skin for change and sun protection with hats and sunscreen, as well as sun avoidance. 7. History of nonmelanoma skin cancer - No evidence of recurrence. Discussed risk of subsequent skin cancers and increased risk of melanoma. Reviewed importance of monitoring skin for change and sun protection with hats and sunscreen, as well as sun avoidance. DIAGNOSIS:   A. Left mid suprabrow-   Squamous cell carcinoma in-situ   There is overlying parakeratosis and full-thickness atypia   of the keratinocytes consistent with squamous cell   carcinoma in-situ. An underlying lymphocytic infiltrate as   well as solar elastosis are present. - Efudex 5% cream BID for 1 month  B. Right helix-   Superficial squamous cell carcinoma, moderately   differentiated, transected at base   There are atypical keratinocytes within the basal cell   layer.  Focally the atypical keratinocytes with moderate   pleomorphism are proliferating downward into the   superficial dermis producing a pattern of early squamous   cell carcinoma.  The lesion extends to the deep margin and,   therefore, the extent of invasion cannot be determined. - Mohs, Scott Neltner  C. Right cheek above mandible-   Basal Cell Carcinoma, Nodular Type   Within the dermis there are variably sized nests of basal   cell carcinoma. - Mohs, Scott Neltner  D. Right anterior forearm-   Squamous cell carcinoma in-situ   There is overlying parakeratosis and full-thickness atypia   of the keratinocytes consistent with squamous cell   carcinoma in-situ.  An underlying lymphocytic infiltrate as   well as solar elastosis are present. Treated with curettage at the time of biopsy  E. Left lower back-   Squamous cell carcinoma in-situ   There is overlying parakeratosis and full-thickness atypia   of the keratinocytes consistent with squamous cell   carcinoma in-situ. An underlying lymphocytic infiltrate as   well as solar elastosis are present. Treated with curettage at the time of biopsy  F. Right Mid PS Back-   Lichenoid keratosis   The epidermis is focally thinned with overlying   parakeratosis and scattered colloid bodies. Guy Fail is an   underlying bandlike mononuclear infiltrate.  There is no   cytologic atypia. Treated with curettage at the time of biopsy  RESULTS SIGNATURE   Cindy Robertson MD

## 2020-12-21 ENCOUNTER — TELEPHONE (OUTPATIENT)
Dept: DERMATOLOGY | Age: 80
End: 2020-12-21

## 2020-12-21 LAB — DERMATOLOGY PATHOLOGY REPORT: ABNORMAL

## 2020-12-21 NOTE — TELEPHONE ENCOUNTER
----- Message from Pennie Lockhart MD sent at 12/21/2020  3:25 PM EST -----  A. Left mid supra brow squamous cell carcinoma in situ-please have patient use Efudex 5% cream twice daily for 1 month  B.  Right helix superficial squamous cell carcinoma extending to deep margin-please schedule Mohs with Helio Graves  C. Right cheek above mandible nodular basal cell carcinoma-please schedule Mohs with Helio VALLEJO and E right anterior forearm and left lower back both squamous cell carcinomas in situ treated with curettage at the time of biopsy  F.  Right mid paraspinal back lichenoid keratosis-benign, no further treatment      DIAGNOSIS:   A. Left mid suprabrow-   Squamous cell carcinoma in-situ   There is overlying parakeratosis and full-thickness atypia   of the keratinocytes consistent with squamous cell   carcinoma in-situ. An underlying lymphocytic infiltrate as   well as solar elastosis are present. - Efudex 5% cream BID for 1 month  B. Right helix-   Superficial squamous cell carcinoma, moderately   differentiated, transected at base   There are atypical keratinocytes within the basal cell   layer.  Focally the atypical keratinocytes with moderate   pleomorphism are proliferating downward into the   superficial dermis producing a pattern of early squamous   cell carcinoma.  The lesion extends to the deep margin and,   therefore, the extent of invasion cannot be determined. - Mohs, Scott Neltner C. Right cheek above mandible-   Basal Cell Carcinoma, Nodular Type   Within the dermis there are variably sized nests of basal   cell carcinoma. - Mohs, Scott Neltner D. Right anterior forearm-   Squamous cell carcinoma in-situ   There is overlying parakeratosis and full-thickness atypia   of the keratinocytes consistent with squamous cell   carcinoma in-situ. An underlying lymphocytic infiltrate as   well as solar elastosis are present. Treated with curettage at the time of biopsy  E.  Left lower back-   Squamous cell carcinoma in-situ   There is overlying parakeratosis and full-thickness atypia   of the keratinocytes consistent with squamous cell   carcinoma in-situ. An underlying lymphocytic infiltrate as   well as solar elastosis are present. Treated with curettage at the time of biopsy  F. Right Mid PS Back-   Lichenoid keratosis   The epidermis is focally thinned with overlying   parakeratosis and scattered colloid bodies. Heriberto Pennock is an   underlying bandlike mononuclear infiltrate.  There is no   cytologic atypia.  Treated with curettage at the time of biopsy  RESULTS SIGNATURE

## 2020-12-30 ENCOUNTER — TELEPHONE (OUTPATIENT)
Dept: DERMATOLOGY | Age: 80
End: 2020-12-30

## 2020-12-30 NOTE — TELEPHONE ENCOUNTER
Spoke with Nataliya Brush a Patient Care Technician, she was able to take the patient's prescription for Efudex  Off hold and it will be mailed out. Patient notified and stated understanding.

## 2021-03-11 ENCOUNTER — OFFICE VISIT (OUTPATIENT)
Dept: DERMATOLOGY | Age: 81
End: 2021-03-11
Payer: MEDICARE

## 2021-03-11 VITALS — TEMPERATURE: 97.2 F

## 2021-03-11 DIAGNOSIS — Z85.820 HISTORY OF MALIGNANT MELANOMA: ICD-10-CM

## 2021-03-11 DIAGNOSIS — L43.9 LICHEN PLANUS: ICD-10-CM

## 2021-03-11 DIAGNOSIS — Z79.899 HIGH RISK MEDICATION USE: ICD-10-CM

## 2021-03-11 DIAGNOSIS — Z85.828 HISTORY OF NONMELANOMA SKIN CANCER: ICD-10-CM

## 2021-03-11 DIAGNOSIS — L57.0 KERATOSIS, ACTINIC: ICD-10-CM

## 2021-03-11 DIAGNOSIS — D22.9 BENIGN NEVUS: ICD-10-CM

## 2021-03-11 DIAGNOSIS — D48.5 NEOPLASM OF UNCERTAIN BEHAVIOR OF SKIN: Primary | ICD-10-CM

## 2021-03-11 PROCEDURE — 11103 TANGNTL BX SKIN EA SEP/ADDL: CPT | Performed by: DERMATOLOGY

## 2021-03-11 PROCEDURE — 99214 OFFICE O/P EST MOD 30 MIN: CPT | Performed by: DERMATOLOGY

## 2021-03-11 PROCEDURE — 11102 TANGNTL BX SKIN SINGLE LES: CPT | Performed by: DERMATOLOGY

## 2021-03-11 NOTE — PROGRESS NOTES
[facility]) Dermatology  Perez Alford M.D.  [phone]       [facility]  1940    79 y.o. male     Date of Visit: 3/11/2021    Chief Complaint:   Chief Complaint   Patient presents with    Skin Exam     3 mo FSE , Efudex f/u       HX: [place], neoplasm/skin        I was asked to see this patient by Dr. Pineda ref. provider found. History of Present Illness:  1. Total-body skin exam    Actinic keratoses-completed Efudex 5% cream twice daily for 2 weeks to peripheral face and scalp-has healed well. Did see Gene Cabello for Mohs surgery right helix and right cheek while completing Efudex-was unable to have Mohs surgery as the sites were not identified due to inflammation. Multiple nevi. Stable in size, shape, color. Has not noticed any new or changing pigmented lesions    Chemoprevention with acitretin-supposed to increase back to 25 mg every day but patient was concerned about liver function and continue 25 mg every other day. Has had fewer skin cancer since initiation of acitretin. Does have newly diagnosed polycythemia vera-being treated with phlebotomy         Skin history:  History of metastatic colon cancer currently in remission.  No prior history of sebaceous carcinoma.     History of multiple nonmelanoma skin cancers including multiple squamous cell carcinomas treated previously in his lower legs with curette, Mohs surgery, liquid nitrogen at outside hospital     11/17 right lateral forehead basal cell carcinoma status post Mohs  11/17 left mid helix basal cell carcinoma status post Mohs  1/18 Efudex face and scalp  2/18 malignant melanoma right mid lateral back status post wide local excision by Gene Cabello on 3/1/18.  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 status post wide local excision  4/18 right mid lateral back basal cell carcinoma status post curettage  7/18 left dorsal distal forearm nodular basal cell carcinoma treated with curettage  7/18 left thenar wrist well-differentiated squamous cell carcinoma status post curettage  7/18 mid posterior neck superficial basal cell carcinoma status post curettage  9/18 left mid thenar forearm superficial basal cell carcinoma status post curettage  9/18 left chest superficial basal cell carcinoma status post curettage  9/18 right lower back superficial basal cell carcinoma status post curettage  9/18 right lower back inferior superficial basal cell carcinoma status post curettage  11/18 left dorsal hand superficial squamous cell carcinoma status post curettage  11/18 left thenar forearm nodular basal cell carcinoma status post curettage  11/18 right scapular back superficial basal cell carcinoma status post curettage  1/19 left chest superficial squamous cell carcinoma status post curettage  1/19 right lateral supra-brow at Mohs site-actinic keratosis  1/19 right mid cheek superficial basal cell carcinoma status post Mohs 3/19  4/19 left scapular tip dysplastic nevus with moderate dysplasia, completely excised  4/19 right anterior forearm nodular basal cell carcinoma status post curettage  4/19 left ala hypertrophic actinic keratosis  4/19 left ulnar forearm squamous cell carcinoma status post curettage  4/19 right posterior vertex moderately differentiated squamous cell carcinoma status post curettage-punch biopsy 6/19 with no evidence of recurrence  6/19 right posterior shoulder superficial basal cell carcinoma status post curettage  6/19 left posterior neck nodular basal cell carcinoma status post curettage  9/19 right posterior shoulder nodular basal cell carcinoma status post curettage  6/20 right proximal shoulder superficial basal cell carcinoma status post curettage  6/20 left anterior shoulder actinic keratosis status post curettage  6/20 left lateral earlobe nodular basal cell carcinoma status post Mohs  12/20 left mid supra brow squamous cell carcinoma in situ-status post Efudex  12/20 right helix superficial squamous cell carcinoma-resolved after Efudex  12/20 right cheek above mandible nodular basal cell carcinoma-Mohs deferred due to Efudex  12/20 right anterior forearm squamous cell carcinoma in situ status post curettage  12/20 left lower back squamous cell carcinoma in situ status post curettage     Acitretin 25 mg every other day chemo prevention- increased to 25 mg p.o. q. day 11/18.  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  These resolved on acitretin and with intralesional 5-fluorouracil.     Lichen planus- clobetasol, intralesional Kenalog, acitretin    Review of Systems:  Constitutional: Reports general sense of well-being       Past Medical History, Surgical History, Family History, Medications and Allergies reviewed. Social History:   Social History     Socioeconomic History    Marital status:      Spouse name: Not on file    Number of children: Not on file    Years of education: Not on file    Highest education level: Not on file   Occupational History    Not on file   Social Needs    Financial resource strain: Not on file    Food insecurity     Worry: Not on file     Inability: Not on file    Transportation needs     Medical: Not on file     Non-medical: Not on file   Tobacco Use    Smoking status: Never Smoker    Smokeless tobacco: Never Used   Substance and Sexual Activity    Alcohol use:  Yes    Drug use: Not on file    Sexual activity: Not on file   Lifestyle    Physical activity     Days per week: Not on file     Minutes per session: Not on file    Stress: Not on file   Relationships    Social connections     Talks on phone: Not on file     Gets together: Not on file     Attends Pentecostal service: Not on file     Active member of club or organization: Not on file     Attends meetings of clubs or organizations: Not on file     Relationship status: Not on file    Intimate partner violence     Fear of current or ex partner: Not on file     Emotionally abused: Not on file     Physically abused: Not on file     Forced sexual activity: Not on file   Other Topics Concern    Not on file   Social History Narrative    Not on file       Physical Examination       -General: Well-appearing, NAD  1. Normal affect. Total body skin exam including scalp, face, neck, chest, abdomen, back, bilateral upper extremities, bilateral lower extremities, ocular conjunctiva, external lips, and nails was performed. Examination normal unless stated below. Underwear area not examined. Scattered on the trunk and extremities are multiple well-defined round and oval symmetric smoothly-bordered uniformly brown macules and papules. No active lichen planus today  Background actinic change dorsal forearms, shoulders-face and scalp much improved  Squamous cell carcinoma right helix noted at his last visit has resolved        Left mid cheek 9 mm erythematous papule rule out basal cell carcinoma  Left malar cheek 1.0 cm erythematous plaque rule out basal cell carcinoma  Right cheek above mandible previously biopsied basal cell carcinoma with well-healed scar-1.0 cm total lesion size      Assessment and Plan     1. Neoplasm of uncertain behavior of skin-left mid cheek, left malar cheek rule out basal cell carcinoma--Discussed possible diagnosis. Patient agreeable to biopsy. Verbal consent obtained after risks (infection, bleeding, scar), benefits and alternatives explained. -Area(s) to be biopsied were marked with a surgical pen. Site(s) were cleansed with alcohol. Local anesthesia achieved with 1% lidocaine with epinephrine/sodium bicarbonate. Shave biopsy performed with a razor blade. Hemostasis was achieved with aluminum chloride. The wound(s) were dressed with petrolatum and covered with a bandage. Specimen(s) sent to pathology. Pt educated re: risk of bleeding, infection, scar and wound care instructions.       Of previously biopsied lesions not treated-still needs Mohs for biopsy-proven basal cell carcinoma right cheek above mandible. Photo taken with patient's phone of this lesion   2. Benign nevus - Benign acquired melanocytic nevi  -Recommend monthly self skin exams   -Educated regarding the ABCDEs of melanoma detection   -Call for any new/changing moles or concerning lesions  -Reviewed sun protective behavior -- sun avoidance during the peak hours of the day, sun-protective clothing (including hat and sunglasses), sunscreen use (water resistant, broad spectrum, SPF at least 30, need for reapplication every 2 to 3 hours), avoidance of tanning beds      3. Lichen planus-no treatment for now   4. High risk medication use-continue acitretin 25 mg every other day-discussed with patient. Do think he would have more improvement at 25 mg every day if tolerated. 5. Keratosis, actinic -monitor for recurrence-plan for regular Efudex courses   6. History of malignant melanoma - No evidence of recurrence. Discussed risk of subsequent skin cancers and increased risk of melanoma. Reviewed importance of monitoring skin for change and sun protection with hats and sunscreen, as well as sun avoidance. 7. History of nonmelanoma skin cancer - No evidence of recurrence. Discussed risk of subsequent skin cancers and increased risk of melanoma. Reviewed importance of monitoring skin for change and sun protection with hats and sunscreen, as well as sun avoidance. Suspicious papule left thenar forearm and right posterior upper arm just above elbow-suspicious for basal cell carcinoma.   Return visit 4 to 6 weeks for ongoing biopsy and treatment    Permission granted from patient to email corresponding physicians including Olivia Woods regarding treatment or skin conditions

## 2021-04-22 ENCOUNTER — OFFICE VISIT (OUTPATIENT)
Dept: DERMATOLOGY | Age: 81
End: 2021-04-22
Payer: MEDICARE

## 2021-04-22 VITALS — TEMPERATURE: 97.3 F

## 2021-04-22 DIAGNOSIS — D22.9 BENIGN NEVUS: ICD-10-CM

## 2021-04-22 DIAGNOSIS — L57.0 KERATOSIS, ACTINIC: Primary | ICD-10-CM

## 2021-04-22 DIAGNOSIS — C44.629 SQUAMOUS CELL CANCER OF SKIN OF FOREARM, LEFT: ICD-10-CM

## 2021-04-22 DIAGNOSIS — C44.41 BASAL CELL CARCINOMA (BCC) OF NECK: ICD-10-CM

## 2021-04-22 DIAGNOSIS — C44.619 BASAL CELL CARCINOMA (BCC) OF LEFT UPPER ARM: ICD-10-CM

## 2021-04-22 DIAGNOSIS — Z85.828 HISTORY OF NONMELANOMA SKIN CANCER: ICD-10-CM

## 2021-04-22 DIAGNOSIS — Z79.899 HIGH RISK MEDICATION USE: ICD-10-CM

## 2021-04-22 DIAGNOSIS — Z85.820 HISTORY OF MALIGNANT MELANOMA: ICD-10-CM

## 2021-04-22 PROCEDURE — 17000 DESTRUCT PREMALG LESION: CPT | Performed by: DERMATOLOGY

## 2021-04-22 PROCEDURE — 17003 DESTRUCT PREMALG LES 2-14: CPT | Performed by: DERMATOLOGY

## 2021-04-22 PROCEDURE — 17271 DSTR MAL LES S/N/H/F/G 0.6-1: CPT | Performed by: DERMATOLOGY

## 2021-04-22 PROCEDURE — 17262 DSTRJ MAL LES T/A/L 1.1-2.0: CPT | Performed by: DERMATOLOGY

## 2021-04-22 PROCEDURE — 99214 OFFICE O/P EST MOD 30 MIN: CPT | Performed by: DERMATOLOGY

## 2021-04-22 RX ORDER — ROSUVASTATIN CALCIUM 10 MG/1
10 TABLET, COATED ORAL DAILY
COMMUNITY

## 2021-04-22 NOTE — PATIENT INSTRUCTIONS
Biopsy Wound Care Instructions   Cleanse the wound with mild soapy water daily.  Gently dry the area.  Apply Vaseline or petroleum jelly to the wound using a cotton tipped applicator or Qtip.  Cover with a clean bandage.  Repeat this process until the biopsy site is healed.  If you had stitches placed, continue treating the site until the stitches are removed. Remember to make an appointment to return to have your stitches removed by our staff.  You may shower and bathe as usual.   ** Please allow 7-10 business days for staff to contact you regarding biopsy results. If you have not heard from us by then, please call the office at [phone] between 8AM and 4PM Monday through Friday.

## 2021-04-22 NOTE — PROGRESS NOTES
[facility]) Dermatology  Humberto Clark M.D.  [phone]       Brannon Melara  1940    79 y.o. male     Date of Visit: 4/22/2021    Chief Complaint:   Chief Complaint   Patient presents with    Skin Lesion     spots , UBSE        I was asked to see this patient by Dr. Pineda ref. provider found. History of Present Illness:  1. Waist up skin check-follow-up    Healed well after Mohs right cheek    Multiple nevi. Stable in size, shape, color. Has not noticed any new or changing pigmented lesions. Currently on acitretin 25 mg every other day-when he takes 25 mg every day develops cheilitis that is uncomfortable despite regular use of a moisturizer. Still has xerosis even at 25 mg every other day. Has not noted any new squamous cell carcinomas. Is wearing hats and sunscreen regularly. Skin history:  History of metastatic colon cancer currently in remission.  No prior history of sebaceous carcinoma.     History of multiple nonmelanoma skin cancers including multiple squamous cell carcinomas treated previously in his lower legs with curette, Mohs surgery, liquid nitrogen at outside hospital     11/17 right lateral forehead basal cell carcinoma status post Mohs  11/17 left mid helix basal cell carcinoma status post Mohs  1/18 Efudex face and scalp  2/18 malignant melanoma right mid lateral back status post wide local excision by Lawson Nassar on 3/1/18.  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 q. day 11/18.  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  These resolved on acitretin and with intralesional 5-fluorouracil.     Lichen planus- clobetasol, intralesional Kenalog, acitretin       Review of Systems:  Constitutional: Reports general sense of well-being       Past Medical History, Surgical History, Family History, Medications and Allergies reviewed. Social History:   Social History     Socioeconomic History    Marital status:      Spouse name: Not on file    Number of children: Not on file    Years of education: Not on file    Highest education level: Not on file   Occupational History    Not on file   Social Needs    Financial resource strain: Not on file    Food insecurity     Worry: Not on file     Inability: Not on file    Transportation needs     Medical: Not on file     Non-medical: Not on file   Tobacco Use    Smoking status: Never Smoker    Smokeless tobacco: Never Used   Substance and Sexual Activity    Alcohol use:  Yes    Drug use: Not on file    Sexual activity: Not on file   Lifestyle    Physical activity     Days per week: Not on file     Minutes per session: Not on file    Stress: Not on file   Relationships    Social connections     Talks on phone: Not on file     Gets together: Not on file     Attends Buddhism service: Not on file     Active member of club or organization: Not on file     Attends meetings of clubs or organizations: Not on file     Relationship status: Not on file    Intimate partner violence     Fear of current or ex partner: Not on file     Emotionally abused: Not on file     Physically abused: Not on file     Forced sexual activity: Not on file   Other Topics Concern    Not on file   Social History Narrative    Not on file       Physical Examination       -General: Well-appearing, NAD  1. Waist up and anterior lower leg exam  Gritty erythematous papule left chest, right back  Xerosis of lips  Scattered on the trunk and extremities are multiple well-defined round and oval symmetric smoothly-bordered uniformly brown macules and papules. Multiple well-healed scars no sign of recurrence      Left thenar forearm 8 mm erythematous papule rule out basal cell carcinoma  Left mid upper arm 7 mm erythematous papule rule out basal cell carcinoma  Left posterior neck 7 mm erythematous papule rule out basal cell carcinoma              Labs reviewed from 4/21-elevated platelets, hemoglobin hematocrit and white count within normal limits. Basic and LFTs within normal limits 12/20    Assessment and Plan     1. Neoplasm of uncertain behavior of skin-left thenar forearm, left mid upper arm, left posterior neck--Discussed possible diagnosis. Patient agreeable to biopsy. Verbal consent obtained after risks (infection, bleeding, scar), benefits and alternatives explained. -Area(s) to be biopsied were marked with a surgical pen. Site(s) were cleansed with alcohol. Local anesthesia achieved with 1% lidocaine with epinephrine/sodium bicarbonate. Shave biopsy performed with a razor blade. Hemostasis was achieved with aluminum chloride. The wound(s) were dressed with petrolatum and covered with a bandage. Specimen(s) sent to pathology. Pt educated re: risk of bleeding, infection, scar and wound care instructions. Curettage performed after informed written consent obtained following explanation of risks, benefits, alternatives  -Local anesthesia acheived with 1% lidocaine with epinephrine/sodium bicarbonate. Sharp curettage performed in 3 different directions. First pass size 11 mm left thenar forearm, 1.4 cm left mid upper arm, 1.0 cm left posterior neck. Hemostasis obtained with aluminum chloride.   -Edu re: bleeding, discomfort, infection, scar  -Edu re: wound care, risk of recurrence     2.  Keratosis, actinic -2-left chest, right back lesion(s) treated w/ liquid nitrogen. Edu re: risk of blister formation, discomfort, scar, hypopigmentation. Discussed wound care. 3. Benign nevus - Benign acquired melanocytic nevi  -Recommend monthly self skin exams   -Educated regarding the ABCDEs of melanoma detection   -Call for any new/changing moles or concerning lesions  -Reviewed sun protective behavior -- sun avoidance during the peak hours of the day, sun-protective clothing (including hat and sunglasses), sunscreen use (water resistant, broad spectrum, SPF at least 30, need for reapplication every 2 to 3 hours), avoidance of tanning beds      4. High risk medication use -continue acitretin 25 mg every other day. Will be due for labs at his next appointment-including cholesterol panel. Reviewed side effects and contraindications-continue sun protection, lubricants   5. History of malignant melanoma - No evidence of recurrence. Discussed risk of subsequent skin cancers and increased risk of melanoma. Reviewed importance of monitoring skin for change and sun protection with hats and sunscreen, as well as sun avoidance. 6. History of nonmelanoma skin cancer - No evidence of recurrence. Discussed risk of subsequent skin cancers and increased risk of melanoma. Reviewed importance of monitoring skin for change and sun protection with hats and sunscreen, as well as sun avoidance. DIAGNOSIS:   A. Left thenar forearm-   Squamous cell carcinoma, well differentiated   B. Left mid upper arm-   Basal Cell Carcinoma, Superficial Type       C.  Left posterior neck-   Basal Cell Carcinoma, Superficial Type       RESULTS SIGNATURE   Ricarda Alex MD     All 3 lesions treated with curettage at the time of biopsy

## 2021-04-26 LAB — DERMATOLOGY PATHOLOGY REPORT: ABNORMAL

## 2021-06-23 ENCOUNTER — OFFICE VISIT (OUTPATIENT)
Dept: DERMATOLOGY | Age: 81
End: 2021-06-23
Payer: MEDICARE

## 2021-06-23 VITALS — TEMPERATURE: 97.7 F

## 2021-06-23 DIAGNOSIS — Z85.828 HISTORY OF NONMELANOMA SKIN CANCER: ICD-10-CM

## 2021-06-23 DIAGNOSIS — D22.9 BENIGN NEVUS: ICD-10-CM

## 2021-06-23 DIAGNOSIS — Z79.899 HIGH RISK MEDICATION USE: ICD-10-CM

## 2021-06-23 DIAGNOSIS — Z85.820 HISTORY OF MALIGNANT MELANOMA: ICD-10-CM

## 2021-06-23 DIAGNOSIS — D48.5 NEOPLASM OF UNCERTAIN BEHAVIOR OF SKIN: ICD-10-CM

## 2021-06-23 DIAGNOSIS — L57.0 KERATOSIS, ACTINIC: Primary | ICD-10-CM

## 2021-06-23 DIAGNOSIS — T14.8XXA HEMATOMA: ICD-10-CM

## 2021-06-23 PROCEDURE — 10060 I&D ABSCESS SIMPLE/SINGLE: CPT | Performed by: DERMATOLOGY

## 2021-06-23 PROCEDURE — 17000 DESTRUCT PREMALG LESION: CPT | Performed by: DERMATOLOGY

## 2021-06-23 PROCEDURE — 99214 OFFICE O/P EST MOD 30 MIN: CPT | Performed by: DERMATOLOGY

## 2021-06-23 PROCEDURE — 17003 DESTRUCT PREMALG LES 2-14: CPT | Performed by: DERMATOLOGY

## 2021-06-23 PROCEDURE — 11102 TANGNTL BX SKIN SINGLE LES: CPT | Performed by: DERMATOLOGY

## 2021-06-23 PROCEDURE — 11103 TANGNTL BX SKIN EA SEP/ADDL: CPT | Performed by: DERMATOLOGY

## 2021-06-23 NOTE — PROGRESS NOTES
situ-status post Efudex  12/20 right helix superficial squamous cell carcinoma-resolved after Efudex  12/20 right cheek above mandible nodular basal cell carcinoma-Mohs deferred due to Efudex-completed 4/21 12/20 right anterior forearm squamous cell carcinoma in situ status post curettage  12/20 left lower back squamous cell carcinoma in situ status post curettage  4/21 left thenar forearm well differentiated squamous cell carcinoma status post curettage  4/21 left mid upper arm basal cell carcinoma, superficial status post curettage  4/21 left posterior neck superficial basal cell carcinoma status post curettage      Acitretin 25 mg every other day chemo prevention- increased to 25 mg p.o. q. day 11/18.  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  These resolved on acitretin and with intralesional 5-fluorouracil.     Lichen planus- clobetasol, intralesional Kenalog, acitretin            Review of Systems:  Constitutional: Reports general sense of well-being       Past Medical History, Surgical History, Family History, Medications and Allergies reviewed. Social History:   Social History     Socioeconomic History    Marital status:      Spouse name: Not on file    Number of children: Not on file    Years of education: Not on file    Highest education level: Not on file   Occupational History    Not on file   Tobacco Use    Smoking status: Never Smoker    Smokeless tobacco: Never Used   Vaping Use    Vaping Use: Never used   Substance and Sexual Activity    Alcohol use:  Yes    Drug use: Not on file    Sexual activity: Not on file   Other Topics Concern    Not on file   Social History Narrative    Not on file     Social Determinants of Health     Financial Resource Strain:     Difficulty of Paying Living Expenses:    Food Insecurity:     Worried About Running Out of Food in the Last Year:     [address] in the Last Year: behavior of skin-left mid paraspinal back, left mid clavicle, left thenar forearm--Discussed possible diagnosis. Patient agreeable to biopsy. Verbal consent obtained after risks (infection, bleeding, scar), benefits and alternatives explained. -Area(s) to be biopsied were marked with a surgical pen. Site(s) were cleansed with alcohol. Local anesthesia achieved with 1% lidocaine with epinephrine/sodium bicarbonate. Shave biopsy performed with a razor blade. Hemostasis was achieved with aluminum chloride. The wound(s) were dressed with petrolatum and covered with a bandage. Specimen(s) sent to pathology. Pt educated re: risk of bleeding, infection, scar and wound care instructions. Left mid clavicle only:  Curettage performed after informed written consent obtained following explanation of risks, benefits, alternatives  -Local anesthesia acheived with 1% lidocaine with epinephrine/sodium bicarbonate. Sharp curettage performed in 3 different directions. First pass size 12mm. Hemostasis obtained with aluminum chloride.   -Edu re: bleeding, discomfort, infection, scar  -Edu re: wound care, risk of recurrence     3. Benign nevus - Benign acquired melanocytic nevi  -Recommend monthly self skin exams   -Educated regarding the ABCDEs of melanoma detection   -Call for any new/changing moles or concerning lesions  -Reviewed sun protective behavior -- sun avoidance during the peak hours of the day, sun-protective clothing (including hat and sunglasses), sunscreen use (water resistant, broad spectrum, SPF at least 30, need for reapplication every 2 to 3 hours), avoidance of tanning beds      4. Hematoma-after the risk, complications, alternatives were explained to the patient informed consent was obtained. Lesion was cleansed with alcohol and incision was made. Lesion was evacuated of blood   5.  High risk medication use-continue acitretin 25 mg every other day for both lichen planus and prevention of squamous cell carcinomas. Will request outside hospital labs from primary care doctor   6. History of malignant melanoma - No evidence of recurrence. Discussed risk of subsequent skin cancers and increased risk of melanoma. Reviewed importance of monitoring skin for change and sun protection with hats and sunscreen, as well as sun avoidance. 7. History of nonmelanoma skin cancer - No evidence of recurrence. Discussed risk of subsequent skin cancers and increased risk of melanoma. Reviewed importance of monitoring skin for change and sun protection with hats and sunscreen, as well as sun avoidance.

## 2021-06-24 LAB — DERMATOLOGY PATHOLOGY REPORT: NORMAL

## 2021-09-09 ENCOUNTER — OFFICE VISIT (OUTPATIENT)
Dept: DERMATOLOGY | Age: 81
End: 2021-09-09
Payer: MEDICARE

## 2021-09-09 VITALS — TEMPERATURE: 97.7 F

## 2021-09-09 DIAGNOSIS — Z85.820 HISTORY OF MALIGNANT MELANOMA: ICD-10-CM

## 2021-09-09 DIAGNOSIS — Z85.828 HISTORY OF NONMELANOMA SKIN CANCER: ICD-10-CM

## 2021-09-09 DIAGNOSIS — D22.9 BENIGN NEVUS: ICD-10-CM

## 2021-09-09 DIAGNOSIS — L57.0 AK (ACTINIC KERATOSIS): ICD-10-CM

## 2021-09-09 DIAGNOSIS — D48.5 NEOPLASM OF UNCERTAIN BEHAVIOR OF SKIN: Primary | ICD-10-CM

## 2021-09-09 DIAGNOSIS — B35.1 ONYCHOMYCOSIS: ICD-10-CM

## 2021-09-09 DIAGNOSIS — Z79.899 HIGH RISK MEDICATION USE: ICD-10-CM

## 2021-09-09 DIAGNOSIS — L43.9 LICHEN PLANUS: ICD-10-CM

## 2021-09-09 DIAGNOSIS — L57.0 ACTINIC KERATOSIS TREATED WITH TOPICAL FLUOROURACIL (5FU): ICD-10-CM

## 2021-09-09 PROCEDURE — 11103 TANGNTL BX SKIN EA SEP/ADDL: CPT | Performed by: DERMATOLOGY

## 2021-09-09 PROCEDURE — 17003 DESTRUCT PREMALG LES 2-14: CPT | Performed by: DERMATOLOGY

## 2021-09-09 PROCEDURE — 11102 TANGNTL BX SKIN SINGLE LES: CPT | Performed by: DERMATOLOGY

## 2021-09-09 PROCEDURE — 99214 OFFICE O/P EST MOD 30 MIN: CPT | Performed by: DERMATOLOGY

## 2021-09-09 PROCEDURE — 17000 DESTRUCT PREMALG LESION: CPT | Performed by: DERMATOLOGY

## 2021-09-09 RX ORDER — CICLOPIROX 7.7 MG/G
GEL TOPICAL
Qty: 30 G | Refills: 4 | Status: SHIPPED | OUTPATIENT
Start: 2021-09-09

## 2021-09-09 NOTE — Clinical Note
Pls check to see if patient needs new efudex or if he has some. Glad to order if he does. I did send ciclopirox for toenails.

## 2021-09-09 NOTE — PROGRESS NOTES
[facility]) Dermatology  Светлана Mchugh M.D.  [phone]       Nai Peng  1940    79 y.o. male     Date of Visit: 9/9/2021    Chief Complaint:   Chief Complaint   Patient presents with    Skin Lesion     3-4 mo FSE           HX: Neoplasm/skin, [place]        I was asked to see this patient by Dr. Pineda ref. provider found. History of Present Illness:  1. Total-body skin exam    Erythematous plaque left lower leg present for the last 3 to 4 months-has increased in size. Not previously treated. Not itching or bleeding. Multiple nevi. Stable in size, shape, color. Has not noticed any new or changing pigmented lesions. 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 Continues to take acitretin. Has labs done through his primary care doctor and oncologist.  Due for a physical in December when he anticipates having labs drawn. Tolerating isotretinoin well. Aware of the importance of reducing alcohol intake. Increasing number of actinic keratoses on his scalp. Not itching, bleeding. Asymptomatic. Onychomycosis-has been using his wife's medication for onychomycosis. Has been keeping toenails trimmed and would like his own prescription      Skin history:  History of metastatic colon cancer currently in remission.  No prior history of sebaceous carcinoma.     History of multiple nonmelanoma skin cancers including multiple squamous cell carcinomas treated previously in his lower legs with curette, Mohs surgery, liquid nitrogen at outside hospital     11/17 right lateral forehead basal cell carcinoma status post Mohs  11/17 left mid helix basal cell carcinoma status post Mohs  1/18 Efudex face and scalp  2/18 malignant melanoma right mid lateral back status post wide local excision by Brian Lai on 3/1/18. 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 status post wide local excision  4/18 right mid lateral back basal cell carcinoma status post curettage  7/18 left dorsal distal forearm nodular basal cell carcinoma treated with curettage  7/18 left thenar wrist well-differentiated squamous cell carcinoma status post curettage  7/18 mid posterior neck superficial basal cell carcinoma status post curettage  9/18 left mid thenar forearm superficial basal cell carcinoma status post curettage  9/18 left chest superficial basal cell carcinoma status post curettage  9/18 right lower back superficial basal cell carcinoma status post curettage  9/18 right lower back inferior superficial basal cell carcinoma status post curettage  11/18 left dorsal hand superficial squamous cell carcinoma status post curettage  11/18 left thenar forearm nodular basal cell carcinoma status post curettage  11/18 right scapular back superficial basal cell carcinoma status post curettage  1/19 left chest superficial squamous cell carcinoma status post curettage  1/19 right lateral supra-brow at Mohs site-actinic keratosis  1/19 right mid cheek superficial basal cell carcinoma status post Mohs 3/19  4/19 left scapular tip dysplastic nevus with moderate dysplasia, completely excised  4/19 right anterior forearm nodular basal cell carcinoma status post curettage  4/19 left ala hypertrophic actinic keratosis  4/19 left ulnar forearm squamous cell carcinoma status post curettage  4/19 right posterior vertex moderately differentiated squamous cell carcinoma status post curettage-punch biopsy 6/19 with no evidence of recurrence  6/19 right posterior shoulder superficial basal cell carcinoma status post curettage  6/19 left posterior neck nodular basal cell carcinoma status post curettage  9/19 right posterior shoulder nodular basal cell carcinoma status post curettage  6/20 right proximal shoulder superficial basal cell carcinoma status post curettage  6/20 left anterior shoulder actinic keratosis status post curettage  6/20 left lateral earlobe nodular basal cell carcinoma status post Mohs  12/20 left mid supra brow squamous cell carcinoma in situ-status post Efudex  12/20 right helix superficial squamous cell carcinoma-resolved after Efudex  12/20 right cheek above mandible nodular basal cell carcinoma-Mohs deferred due to Efudex-completed 4/21 12/20 right anterior forearm squamous cell carcinoma in situ status post curettage  12/20 left lower back squamous cell carcinoma in situ status post curettage  4/21 left thenar forearm well differentiated squamous cell carcinoma status post curettage  4/21 left mid upper arm basal cell carcinoma, superficial status post curettage  4/21 left posterior neck superficial basal cell carcinoma status post curettage        2017-current acitretin 25 mg every other day chemo prevention.  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  These resolved on acitretin and with intralesional 5-fluorouracil.     Lichen planus- clobetasol, intralesional Kenalog, acitretin       Review of Systems:  Constitutional: Reports general sense of well-being       Past Medical History, Surgical History, Family History, Medications and Allergies reviewed. Social History:   Social History     Socioeconomic History    Marital status:      Spouse name: Not on file    Number of children: Not on file    Years of education: Not on file    Highest education level: Not on file   Occupational History    Not on file   Tobacco Use    Smoking status: Never Smoker    Smokeless tobacco: Never Used   Vaping Use    Vaping Use: Never used   Substance and Sexual Activity    Alcohol use:  Yes    Drug use: Not on file    Sexual activity: Not on file   Other Topics Concern    Not on file   Social History Narrative    Not on file     Social Determinants of Health     Financial Resource Strain:     Difficulty of Paying Living Expenses:    Food Insecurity:     Worried About Running Out of Food in the Last Year:     [address] in the Last Year:    Transportation Needs:     Lack of Transportation (Medical):  Lack of Transportation (Non-Medical):    Physical Activity:     Days of Exercise per Week:     Minutes of Exercise per Session:    Stress:     Feeling of Stress :    Social Connections:     Frequency of Communication with Friends and Family:     Frequency of Social Gatherings with Friends and Family:     Attends Hindu Services:     Active Member of Clubs or Organizations:     Attends Club or Organization Meetings:     Marital Status:    Intimate Partner Violence:     Fear of Current or Ex-Partner:     Emotionally Abused:     Physically Abused:     Sexually Abused:        Physical Examination       -General: Well-appearing, NAD  1. Normal affect. Total body skin exam including scalp, face, neck, chest, abdomen, back, bilateral upper extremities, bilateral lower extremities, ocular conjunctiva, external lips, and nails was performed. Examination normal unless stated below. Underwear area not examined. Scattered on the trunk and extremities are multiple well-defined round and oval symmetric smoothly-bordered uniformly brown macules and papules. Gritty erythematous papules scalp  Multiple well-healed scars no sign of recurrence  Distal thickening and subungual debris of multiple toenails          Left distal lateral lower leg 2.4 cm erythematous plaque-rule out nonmelanoma skin cancer/lichen planus  Left posterior proximal lower leg 7 mm keratotic papule inflamed seborrheic keratosis rule out squamous cell carcinoma    Assessment and Plan     1. Neoplasm of uncertain behavior of skin-left distal lateral lower leg and left posterior proximal lower leg--Discussed possible diagnosis. Patient agreeable to biopsy.  Verbal consent obtained after risks (infection, bleeding, scar), benefits and alternatives explained. -Area(s) to be biopsied were marked with a surgical pen. Site(s) were cleansed with alcohol. Local anesthesia achieved with 1% lidocaine with epinephrine/sodium bicarbonate. Shave biopsy performed with a razor blade. Hemostasis was achieved with aluminum chloride. The wound(s) were dressed with petrolatum and covered with a bandage. Specimen(s) sent to pathology. Pt educated re: risk of bleeding, infection, scar and wound care instructions. Left posterior proximal lower leg only:    Curettage performed after informed written consent obtained following explanation of risks, benefits, alternatives  -Local anesthesia acheived with 1% lidocaine with epinephrine/sodium bicarbonate. Sharp curettage performed in 3 different directions. First pass size 9mm. Hemostasis obtained with aluminum chloride.   -Edu re: bleeding, discomfort, infection, scar  -Edu re: wound care, risk of recurrence    Mupirocin until healed     2. Actinic keratosis treated with topical fluorouracil (5FU)-Efudex 5% cream twice daily for 2 weeks scalp. Reviewed side effects, contraindications, proper application. Discussed the importance for strict sun avoidance   3. Benign nevus - Benign acquired melanocytic nevi  -Recommend monthly self skin exams   -Educated regarding the ABCDEs of melanoma detection   -Call for any new/changing moles or concerning lesions  -Reviewed sun protective behavior -- sun avoidance during the peak hours of the day, sun-protective clothing (including hat and sunglasses), sunscreen use (water resistant, broad spectrum, SPF at least 30, need for reapplication every 2 to 3 hours), avoidance of tanning beds      4. Lichen planus-clobetasol ointment twice daily up to 2 weeks as needed for flares   5. Onychomycosis-ciclopirox gel topically to toenails-discussed the need for ongoing treatment   6.  High risk medication use - -Edu re: dry lips/skin/eyes/nose, HA, arthralgias/myalgias, leukopenia, elev LFTs/lipids, IBD flare. Maintenance labs as needed   7. History of nonmelanoma skin cancer - No evidence of recurrence. Discussed risk of subsequent skin cancers and increased risk of melanoma. Reviewed importance of monitoring skin for change and sun protection with hats and sunscreen, as well as sun avoidance. 8. History of malignant melanoma - No evidence of recurrence. Discussed risk of subsequent skin cancers and increased risk of melanoma. Reviewed importance of monitoring skin for change and sun protection with hats and sunscreen, as well as sun avoidance. 9.  Actinic keratoses-7-scalp lesion(s) treated w/ liquid nitrogen. Edu re: risk of blister formation, discomfort, scar, hypopigmentation. Discussed wound care.

## 2021-09-09 NOTE — PROGRESS NOTES
Spoke with patient, patient stated he does not need refills of Efudex at this time. Notified that prescription for ciclopirox has been sent in, patient stated understanding.

## 2021-09-14 LAB — DERMATOLOGY PATHOLOGY REPORT: NORMAL

## 2022-01-03 LAB — DERMATOLOGY PATHOLOGY REPORT: NORMAL

## 2022-01-20 ENCOUNTER — OFFICE VISIT (OUTPATIENT)
Dept: DERMATOLOGY | Age: 82
End: 2022-01-20
Payer: MEDICARE

## 2022-01-20 VITALS — TEMPERATURE: 97.3 F

## 2022-01-20 DIAGNOSIS — D48.5 NEOPLASM OF UNCERTAIN BEHAVIOR OF SKIN: Primary | ICD-10-CM

## 2022-01-20 DIAGNOSIS — D22.9 BENIGN NEVUS: ICD-10-CM

## 2022-01-20 DIAGNOSIS — L92.2 GRANULOMA FACIALE: ICD-10-CM

## 2022-01-20 DIAGNOSIS — Z79.899 HIGH RISK MEDICATION USE: ICD-10-CM

## 2022-01-20 DIAGNOSIS — Z85.828 HISTORY OF NONMELANOMA SKIN CANCER: ICD-10-CM

## 2022-01-20 DIAGNOSIS — L43.9 LICHEN PLANUS: ICD-10-CM

## 2022-01-20 DIAGNOSIS — Z85.820 HISTORY OF MALIGNANT MELANOMA: ICD-10-CM

## 2022-01-20 DIAGNOSIS — L57.0 KERATOSIS, ACTINIC: ICD-10-CM

## 2022-01-20 PROCEDURE — 17003 DESTRUCT PREMALG LES 2-14: CPT | Performed by: DERMATOLOGY

## 2022-01-20 PROCEDURE — 99214 OFFICE O/P EST MOD 30 MIN: CPT | Performed by: DERMATOLOGY

## 2022-01-20 PROCEDURE — 11103 TANGNTL BX SKIN EA SEP/ADDL: CPT | Performed by: DERMATOLOGY

## 2022-01-20 PROCEDURE — 17000 DESTRUCT PREMALG LESION: CPT | Performed by: DERMATOLOGY

## 2022-01-20 PROCEDURE — 11102 TANGNTL BX SKIN SINGLE LES: CPT | Performed by: DERMATOLOGY

## 2022-01-20 RX ORDER — CLOBETASOL PROPIONATE 0.5 MG/G
CREAM TOPICAL
Qty: 60 G | Refills: 1 | Status: SHIPPED | OUTPATIENT
Start: 2022-01-20

## 2022-01-20 RX ORDER — GABAPENTIN 300 MG/1
300 CAPSULE ORAL 3 TIMES DAILY
COMMUNITY

## 2022-01-20 RX ORDER — TACROLIMUS 1 MG/G
OINTMENT TOPICAL
Qty: 30 G | Refills: 1 | Status: SHIPPED | OUTPATIENT
Start: 2022-01-20

## 2022-01-20 NOTE — Clinical Note
Pls get OS labs- Vamshi Henson and send copy of todays visit. Pls also fax copy of todays visit to MD performing corneal txp at [facility] (patient will have name/ fax number).

## 2022-01-20 NOTE — PROGRESS NOTES
[facility]) Dermatology  Ml Reynoso M.D.  [phone]       Ike Patel  1940    79 y.o. male     Date of Visit: 1/20/2022    Chief Complaint:   Chief Complaint   Patient presents with    Skin Lesion        I was asked to see this patient by Dr. Pineda ref. provider found. History of Present Illness:  1. 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 Patient states that it periodically flares and becomes bothersome-more erythematous. Hypertrophic lichen planus bilateral lower legs-biopsied at his last visit. Patient has been using mupirocin. Biopsy sites have healed but continues to have active lichen planus. Also has a few new erythematous papules on his lower leg consistent with his prior lichen planus    Continues to have widespread actinic damage face, arms, upper back-lesions are more discrete currently. Last completed Efudex 4/21. Continues Acitretin, currently taking 25 mg every other day. Tolerating well-does have mild xerosis. Of note, upcoming corneal transplant. Multiple nevi. Stable in size, shape, color. Has not noticed any new or changing pigmented lesions. Does monitor his skin for change. Skin history:  History of metastatic colon cancer currently in remission.  No prior history of sebaceous carcinoma.     History of multiple nonmelanoma skin cancers including multiple squamous cell carcinomas treated previously in his lower legs with curette, Mohs surgery, liquid nitrogen at outside hospital     11/17 right lateral forehead basal cell carcinoma status post Mohs  11/17 left mid helix basal cell carcinoma status post Mohs  1/18 Efudex face and scalp  2/18 malignant melanoma right mid lateral back status post wide local excision by Sherif Levy on 3/1/18.  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 status post wide local excision  4/18 right mid lateral back basal cell carcinoma status post curettage  7/18 left dorsal distal forearm nodular basal cell carcinoma treated with curettage  7/18 left thenar wrist well-differentiated squamous cell carcinoma status post curettage  7/18 mid posterior neck superficial basal cell carcinoma status post curettage  9/18 left mid thenar forearm superficial basal cell carcinoma status post curettage  9/18 left chest superficial basal cell carcinoma status post curettage  9/18 right lower back superficial basal cell carcinoma status post curettage  9/18 right lower back inferior superficial basal cell carcinoma status post curettage  11/18 left dorsal hand superficial squamous cell carcinoma status post curettage  11/18 left thenar forearm nodular basal cell carcinoma status post curettage  11/18 right scapular back superficial basal cell carcinoma status post curettage  1/19 left chest superficial squamous cell carcinoma status post curettage  1/19 right lateral supra-brow at Mohs site-actinic keratosis  1/19 right mid cheek superficial basal cell carcinoma status post Mohs 3/19  4/19 left scapular tip dysplastic nevus with moderate dysplasia, completely excised  4/19 right anterior forearm nodular basal cell carcinoma status post curettage  4/19 left ala hypertrophic actinic keratosis  4/19 left ulnar forearm squamous cell carcinoma status post curettage  4/19 right posterior vertex moderately differentiated squamous cell carcinoma status post curettage-punch biopsy 6/19 with no evidence of recurrence  6/19 right posterior shoulder superficial basal cell carcinoma status post curettage  6/19 left posterior neck nodular basal cell carcinoma status post curettage  9/19 right posterior shoulder nodular basal cell carcinoma status post curettage  6/20 right proximal shoulder superficial basal cell carcinoma status post curettage  6/20 left anterior shoulder actinic keratosis status post curettage  6/20 left lateral earlobe nodular basal cell carcinoma status post Mohs  12/20 left mid supra brow squamous cell carcinoma in situ-status post Efudex  12/20 right helix superficial squamous cell carcinoma-resolved after Efudex  12/20 right cheek above mandible nodular basal cell carcinoma-Mohs deferred due to Efudex-completed 4/21 12/20 right anterior forearm squamous cell carcinoma in situ status post curettage  12/20 left lower back squamous cell carcinoma in situ status post curettage  4/21 left thenar forearm well differentiated squamous cell carcinoma status post curettage  4/21 left mid upper arm basal cell carcinoma, superficial status post curettage  4/21 left posterior neck superficial basal cell carcinoma status post curettage        2017-current acitretin 25 mg every other day chemo prevention.  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  These resolved on acitretin and with intralesional 5-fluorouracil.     Lichen planus- clobetasol, intralesional Kenalog, acitretin       Review of Systems:  Constitutional: Reports general sense of well-being       Past Medical History, Surgical History, Family History, Medications and Allergies reviewed. Social History:   Social History     Socioeconomic History    Marital status:      Spouse name: Not on file    Number of children: Not on file    Years of education: Not on file    Highest education level: Not on file   Occupational History    Not on file   Tobacco Use    Smoking status: Never Smoker    Smokeless tobacco: Never Used   Vaping Use    Vaping Use: Never used   Substance and Sexual Activity    Alcohol use:  Yes    Drug use: Not on file    Sexual activity: Not on file   Other Topics Concern    Not on file   Social History Narrative    Not on file     Social Determinants of Health     Financial Resource Strain:     Difficulty of Paying Living Expenses: Not on file   Food Insecurity:     Worried About [address] in the Last Year: Not on file    [address] in the Last Year: Not on file   Transportation Needs:     Lack of Transportation (Medical): Not on file    Lack of Transportation (Non-Medical): Not on file   Physical Activity:     Days of Exercise per Week: Not on file    Minutes of Exercise per Session: Not on file   Stress:     Feeling of Stress : Not on file   Social Connections:     Frequency of Communication with Friends and Family: Not on file    Frequency of Social Gatherings with Friends and Family: Not on file    Attends Mu-ism Services: Not on file    Active Member of [address] [place] [address] [employer] or Organizations: Not on file    Attends Club or Organization Meetings: Not on file    Marital Status: Not on file   Intimate Partner Violence:     Fear of Current or Ex-Partner: Not on file    Emotionally Abused: Not on file    Physically Abused: Not on file    Sexually Abused: Not on file   Housing Stability:     Unable to Pay for Housing in the Last Year: Not on file    Number of [place] in the Last Year: Not on file    Unstable Housing in the Last Year: Not on file       Physical Examination       -General: Well-appearing, NAD  1. Normal affect. Total body skin exam including scalp, face, neck, chest, abdomen, back, bilateral upper extremities, bilateral lower extremities, ocular conjunctiva, external lips, and nails was performed. Examination normal unless stated below. Underwear area not examined. Scattered on the trunk and extremities are multiple well-defined round and oval symmetric smoothly-bordered uniformly brown macules and papules. Erythematous plaque left lateral malar cheek  Scattered gritty erythematous papules face, arms, back  Erythematous papules and plaques bilateral lower legs-lichen planus          Right lateral canthus 6 mm brown papule rule out atypia  Left mid supra brow 5 mm brown papule with darker brown margin rule out atypia    CBC, LFTs, cholesterol panel      Assessment and Plan     1.  Neoplasm of uncertain behavior of skin-right lateral canthus, left mid supra brow--Discussed possible diagnosis. Patient agreeable to biopsy. Verbal consent obtained after risks (infection, bleeding, scar), benefits and alternatives explained. -Area(s) to be biopsied were marked with a surgical pen. Site(s) were cleansed with alcohol. Local anesthesia achieved with 1% lidocaine with epinephrine/sodium bicarbonate. Shave biopsy performed with a razor blade. Hemostasis was achieved with aluminum chloride. The wound(s) were dressed with petrolatum and covered with a bandage. Specimen(s) sent to pathology. Pt educated re: risk of bleeding, infection, scar and wound care instructions. 2. Keratosis, actinic -7 face, arms, back- lesion(s) treated w/ liquid nitrogen. Edu re: risk of blister formation, discomfort, scar, hypopigmentation. Discussed wound care. 3. Lichen planus-clobetasol cream twice daily for 2 to 3 weeks-reviewed side effects, contraindications, proper application   4. High risk medication use-continue acitretin 25 mg every other day-discussed the importance of letting his doctor performing his corneal transplant know that a side effect of acitretin is xerosis. This may be important for healing. Patient states he has had his labs drawn-I do not have a recent copy. Will request.   5. 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 Benign nevus - Benign acquired melanocytic nevi  -Recommend monthly self skin exams   -Educated regarding the ABCDEs of melanoma detection   -Call for any new/changing moles or concerning lesions  -Reviewed sun protective behavior -- sun avoidance during the peak hours of the day, sun-protective clothing (including hat and sunglasses), sunscreen use (water resistant, broad spectrum, SPF at least 30, need for reapplication every 2 to 3 hours), avoidance of tanning beds      7. History of malignant melanoma - No evidence of recurrence.  Discussed risk of subsequent skin cancers and increased risk of melanoma. Reviewed importance of monitoring skin for change and sun protection with hats and sunscreen, as well as sun avoidance. 8. History of nonmelanoma skin cancer - No evidence of recurrence. Discussed risk of subsequent skin cancers and increased risk of melanoma. Reviewed importance of monitoring skin for change and sun protection with hats and sunscreen, as well as sun avoidance.

## 2022-01-24 LAB — DERMATOLOGY PATHOLOGY REPORT: NORMAL

## 2022-05-12 ENCOUNTER — OFFICE VISIT (OUTPATIENT)
Dept: DERMATOLOGY | Age: 82
End: 2022-05-12
Payer: MEDICARE

## 2022-05-12 VITALS — TEMPERATURE: 97.2 F

## 2022-05-12 DIAGNOSIS — L43.9 LICHEN PLANUS: ICD-10-CM

## 2022-05-12 DIAGNOSIS — D48.5 NEOPLASM OF UNCERTAIN BEHAVIOR OF SKIN: Primary | ICD-10-CM

## 2022-05-12 DIAGNOSIS — Z85.820 HISTORY OF MALIGNANT MELANOMA: ICD-10-CM

## 2022-05-12 DIAGNOSIS — Z85.828 HISTORY OF NONMELANOMA SKIN CANCER: ICD-10-CM

## 2022-05-12 DIAGNOSIS — Z79.899 HIGH RISK MEDICATION USE: ICD-10-CM

## 2022-05-12 DIAGNOSIS — L92.2 GRANULOMA FACIALE: ICD-10-CM

## 2022-05-12 DIAGNOSIS — L57.0 AK (ACTINIC KERATOSIS): ICD-10-CM

## 2022-05-12 PROCEDURE — 11102 TANGNTL BX SKIN SINGLE LES: CPT | Performed by: DERMATOLOGY

## 2022-05-12 PROCEDURE — 17003 DESTRUCT PREMALG LES 2-14: CPT | Performed by: DERMATOLOGY

## 2022-05-12 PROCEDURE — 11103 TANGNTL BX SKIN EA SEP/ADDL: CPT | Performed by: DERMATOLOGY

## 2022-05-12 PROCEDURE — 17000 DESTRUCT PREMALG LESION: CPT | Performed by: DERMATOLOGY

## 2022-05-12 PROCEDURE — 99214 OFFICE O/P EST MOD 30 MIN: CPT | Performed by: DERMATOLOGY

## 2022-05-16 LAB — DERMATOLOGY PATHOLOGY REPORT: ABNORMAL

## 2022-05-18 ENCOUNTER — TELEPHONE (OUTPATIENT)
Dept: DERMATOLOGY | Age: 82
End: 2022-05-18

## 2022-05-18 DIAGNOSIS — C41.1 SQUAMOUS CELL CARCINOMA OF MANDIBLE (HCC): Primary | ICD-10-CM

## 2022-07-07 RX ORDER — ACITRETIN 25 MG/1
CAPSULE ORAL
Qty: 90 CAPSULE | Refills: 11 | Status: SHIPPED | OUTPATIENT
Start: 2022-07-07

## 2022-08-04 ENCOUNTER — TELEPHONE (OUTPATIENT)
Dept: DERMATOLOGY | Age: 82
End: 2022-08-04

## 2022-08-04 NOTE — TELEPHONE ENCOUNTER
Pt calling states he has a skin tag on the Rt side of his neck that he has concern about has a appt in September would like to get it moved up if possible pls return call back @ [phone] to discuss

## 2022-08-11 ENCOUNTER — OFFICE VISIT (OUTPATIENT)
Dept: DERMATOLOGY | Age: 82
End: 2022-08-11
Payer: MEDICARE

## 2022-08-11 DIAGNOSIS — D48.5 NEOPLASM OF UNCERTAIN BEHAVIOR OF SKIN: Primary | ICD-10-CM

## 2022-08-11 PROCEDURE — 11102 TANGNTL BX SKIN SINGLE LES: CPT | Performed by: DERMATOLOGY

## 2022-08-11 NOTE — PROGRESS NOTES
[facility]) Dermatology  Micah Sandoval M.D.  [phone]       [facility]  1940    79 y.o. male     Date of Visit: 8/11/2022    Chief Complaint:   Chief Complaint   Patient presents with    Skin Lesion     Skin tag on neck        I was asked to see this patient by Dr. Pineda ref. provider found. History of Present Illness:  1. New keratotic papule right mid neck-was very small and treated with liquid nitrogen at his last visit. Never resolved and over the last few weeks has increased in size and become painful. Skin history:  History of metastatic colon cancer currently in remission. No prior history of sebaceous carcinoma. History of multiple nonmelanoma skin cancers including multiple squamous cell carcinomas treated previously in his lower legs with curette, Mohs surgery, liquid nitrogen at outside hospital     11/17 right lateral forehead basal cell carcinoma status post Mohs  11/17 left mid helix basal cell carcinoma status post Mohs  1/18 Efudex face and scalp  2/18 malignant melanoma right mid lateral back status post wide local excision by Mark Rodriguez on 3/1/18.  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 status post wide local excision  4/18 right mid lateral back basal cell carcinoma status post curettage  7/18 left dorsal distal forearm nodular basal cell carcinoma treated with curettage  7/18 left thenar wrist well-differentiated squamous cell carcinoma status post curettage  7/18 mid posterior neck superficial basal cell carcinoma status post curettage  9/18 left mid thenar forearm superficial basal cell carcinoma status post curettage  9/18 left chest superficial basal cell carcinoma status post curettage  9/18 right lower back superficial basal cell carcinoma status post curettage  9/18 right lower back inferior superficial basal cell carcinoma status post curettage  11/18 left dorsal hand superficial squamous cell carcinoma status post curettage  11/18 left thenar forearm nodular basal cell carcinoma status post curettage  11/18 right scapular back superficial basal cell carcinoma status post curettage  1/19 left chest superficial squamous cell carcinoma status post curettage  1/19 right lateral supra-brow at Mohs site-actinic keratosis  1/19 right mid cheek superficial basal cell carcinoma status post Mohs 3/19  4/19 left scapular tip dysplastic nevus with moderate dysplasia, completely excised  4/19 right anterior forearm nodular basal cell carcinoma status post curettage  4/19 left ala hypertrophic actinic keratosis  4/19 left ulnar forearm squamous cell carcinoma status post curettage  4/19 right posterior vertex moderately differentiated squamous cell carcinoma status post curettage-punch biopsy 6/19 with no evidence of recurrence  6/19 right posterior shoulder superficial basal cell carcinoma status post curettage  6/19 left posterior neck nodular basal cell carcinoma status post curettage  9/19 right posterior shoulder nodular basal cell carcinoma status post curettage  6/20 right proximal shoulder superficial basal cell carcinoma status post curettage  6/20 left anterior shoulder actinic keratosis status post curettage  6/20 left lateral earlobe nodular basal cell carcinoma status post Mohs  12/20 left mid supra brow squamous cell carcinoma in situ-status post Efudex  12/20 right helix superficial squamous cell carcinoma-resolved after Efudex  12/20 right cheek above mandible nodular basal cell carcinoma-Mohs deferred due to Efudex-completed 4/21 12/20 right anterior forearm squamous cell carcinoma in situ status post curettage  12/20 left lower back squamous cell carcinoma in situ status post curettage  4/21 left thenar forearm well differentiated squamous cell carcinoma status post curettage  4/21 left mid upper arm basal cell carcinoma, superficial status post curettage  4/21 left posterior neck superficial basal cell carcinoma status post curettage

## 2022-08-15 LAB — DERMATOLOGY PATHOLOGY REPORT: NORMAL

## 2022-09-29 ENCOUNTER — OFFICE VISIT (OUTPATIENT)
Dept: DERMATOLOGY | Age: 82
End: 2022-09-29
Payer: MEDICARE

## 2022-09-29 DIAGNOSIS — D22.9 BENIGN NEVUS: ICD-10-CM

## 2022-09-29 DIAGNOSIS — Z85.820 HISTORY OF MALIGNANT MELANOMA: ICD-10-CM

## 2022-09-29 DIAGNOSIS — Z79.899 HIGH RISK MEDICATION USE: ICD-10-CM

## 2022-09-29 DIAGNOSIS — L57.0 ACTINIC KERATOSIS TREATED WITH TOPICAL FLUOROURACIL (5FU): ICD-10-CM

## 2022-09-29 DIAGNOSIS — Z85.828 HISTORY OF NONMELANOMA SKIN CANCER: ICD-10-CM

## 2022-09-29 DIAGNOSIS — L43.9 LICHEN PLANUS: ICD-10-CM

## 2022-09-29 DIAGNOSIS — L57.0 AK (ACTINIC KERATOSIS): Primary | ICD-10-CM

## 2022-09-29 PROCEDURE — 17000 DESTRUCT PREMALG LESION: CPT | Performed by: DERMATOLOGY

## 2022-09-29 PROCEDURE — 1123F ACP DISCUSS/DSCN MKR DOCD: CPT | Performed by: DERMATOLOGY

## 2022-09-29 PROCEDURE — 17003 DESTRUCT PREMALG LES 2-14: CPT | Performed by: DERMATOLOGY

## 2022-09-29 PROCEDURE — 99214 OFFICE O/P EST MOD 30 MIN: CPT | Performed by: DERMATOLOGY

## 2022-09-29 NOTE — PROGRESS NOTES
[facility]) Dermatology  Hughes Cushing, M.D.  [phone]       Josefina Reach  1940    79 y.o. male     Date of Visit: 9/29/2022    Chief Complaint:   Chief Complaint   Patient presents with    Skin Lesion     FSE   HX:Neoplasm/skin        I was asked to see this patient by Dr. Pineda ref. provider found. History of Present Illness:  1. Total-body skin exam    Actinic keratoses-increasing in number left dorsal hand and forearm, patient using Efudex 5% cream-has used twice daily for the last week. Has had some early erythema and crust that is relatively mild so far. Does have a few scattered actinic keratoses scalp, nose, dorsal forearms. Multiple nevi. Patient has not noticed any new or changing pigmented lesions. Stable in size, shape, color. Not itching, bleeding. Lichen planus-has clobetasol cream to use twice daily up to 2 weeks-not currently using    On acitretin 25 mg every other day for chemoprevention. Has had a reduction in squamous cell carcinoma since starting acitretin. Tolerating without difficulty. Has labs done when he sees his primary care doctor. Healed well after most recent squamous cell carcinoma left mandible        Skin history:  History of metastatic colon cancer currently in remission. No prior history of sebaceous carcinoma. History of multiple nonmelanoma skin cancers including multiple squamous cell carcinomas treated previously in his lower legs with curette, Mohs surgery, liquid nitrogen at outside hospital     11/17 right lateral forehead basal cell carcinoma status post Mohs  11/17 left mid helix basal cell carcinoma status post Mohs  1/18 Efudex face and scalp  2/18 malignant melanoma right mid lateral back status post wide local excision by Maryan Mathews on 3/1/18.  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 status post wide local excision  4/18 right mid lateral back basal cell carcinoma status post curettage  7/18 left dorsal distal forearm nodular basal cell carcinoma treated with curettage  7/18 left thenar wrist well-differentiated squamous cell carcinoma status post curettage  7/18 mid posterior neck superficial basal cell carcinoma status post curettage  9/18 left mid thenar forearm superficial basal cell carcinoma status post curettage  9/18 left chest superficial basal cell carcinoma status post curettage  9/18 right lower back superficial basal cell carcinoma status post curettage  9/18 right lower back inferior superficial basal cell carcinoma status post curettage  11/18 left dorsal hand superficial squamous cell carcinoma status post curettage  11/18 left thenar forearm nodular basal cell carcinoma status post curettage  11/18 right scapular back superficial basal cell carcinoma status post curettage  1/19 left chest superficial squamous cell carcinoma status post curettage  1/19 right lateral supra-brow at Mohs site-actinic keratosis  1/19 right mid cheek superficial basal cell carcinoma status post Mohs 3/19  4/19 left scapular tip dysplastic nevus with moderate dysplasia, completely excised  4/19 right anterior forearm nodular basal cell carcinoma status post curettage  4/19 left ala hypertrophic actinic keratosis  4/19 left ulnar forearm squamous cell carcinoma status post curettage  4/19 right posterior vertex moderately differentiated squamous cell carcinoma status post curettage-punch biopsy 6/19 with no evidence of recurrence  6/19 right posterior shoulder superficial basal cell carcinoma status post curettage  6/19 left posterior neck nodular basal cell carcinoma status post curettage  9/19 right posterior shoulder nodular basal cell carcinoma status post curettage  6/20 right proximal shoulder superficial basal cell carcinoma status post curettage  6/20 left anterior shoulder actinic keratosis status post curettage  6/20 left lateral earlobe nodular basal cell carcinoma status post Mohs  12/20 left mid supra brow squamous Transportation Needs: Not on file   Physical Activity: Not on file   Stress: Not on file   Social Connections: Not on file   Intimate Partner Violence: Not on file   Housing Stability: Not on file       Physical Examination       -General: Well-appearing, NAD  1. Normal affect. Total body skin exam including scalp, face, neck, chest, abdomen, back, bilateral upper extremities, bilateral lower extremities, ocular conjunctiva, external lips, and nails was performed. Examination normal unless stated below. Underwear area not examined. Scattered on the trunk and extremities are multiple well-defined round and oval symmetric smoothly-bordered uniformly brown macules and papules. Gritty erythematous papules nasal dorsum, dorsal forearms, more hypertrophic papules left dorsal hand. Does have a few active areas of lichen planus bilateral lower legs. Background xerosis including lips. Well-healed scars no sign of recurrence      Assessment and Plan     1. AK (actinic keratosis) -9-dorsal hands, forearms including left dorsal hand after the risks, complications, and alternatives were explained to the patient, including risk of blister formation, discomfort, scar, hypopigmentation, patient elected to proceed with cryotherapy. Lesion(s) were treated w/ liquid nitrogen using a Cryogun. 1 freeze thaw cycle was performed with a freeze time of 1-5 seconds. There were no immediate complications. Wound care instructions were discussed with the patient. 2. Actinic keratosis treated with topical fluorouracil (5FU)-continue Efudex 5% cream twice daily for 10 more days left dorsal hand and forearm. Reviewed proper use and side effects   3.  Benign nevus - Benign acquired melanocytic nevi  -Recommend monthly self skin exams   -Educated regarding the ABCDEs of melanoma detection   -Call for any new/changing moles or concerning lesions  -Reviewed sun protective behavior -- sun avoidance during the peak hours of the day,

## 2023-01-12 ENCOUNTER — OFFICE VISIT (OUTPATIENT)
Dept: DERMATOLOGY | Age: 83
End: 2023-01-12

## 2023-01-12 ENCOUNTER — TELEPHONE (OUTPATIENT)
Dept: DERMATOLOGY | Age: 83
End: 2023-01-12

## 2023-01-12 DIAGNOSIS — Z79.899 HIGH RISK MEDICATION USE: ICD-10-CM

## 2023-01-12 DIAGNOSIS — Z85.820 HISTORY OF MALIGNANT MELANOMA: ICD-10-CM

## 2023-01-12 DIAGNOSIS — L57.0 AK (ACTINIC KERATOSIS): Primary | ICD-10-CM

## 2023-01-12 DIAGNOSIS — Z85.828 HISTORY OF NONMELANOMA SKIN CANCER: ICD-10-CM

## 2023-01-12 DIAGNOSIS — L43.9 LICHEN PLANUS: ICD-10-CM

## 2023-01-12 DIAGNOSIS — D22.9 BENIGN NEVUS: ICD-10-CM

## 2023-01-12 NOTE — PROGRESS NOTES
[facility]) Dermatology  Eduardo Betts M.D.  [phone]       Edmond Chaney  1940    79 y.o. male     Date of Visit: 1/12/2023    Chief Complaint:   Chief Complaint   Patient presents with    Skin Lesion        I was asked to see this patient by Dr. Pineda ref. provider found. History of Present Illness:  1. Tbse    Multiple nevi. Patient has not noticed any new or changing pigmented lesions. Stable in size, shape, color. Not itching, bleeding. Multiple new erythematous papules on his lower legs. Nontender. New since his last visit. Not previously treated. History of multiple squamous cell carcinomas and lichen planus. On acitretin 25 mg every day. Tolerating acitretin without difficulty-does note xerosis of his lips. Completed Efudex for actinic keratoses left dorsal hand, dorsal forearm-does note good improvement          Skin history:  History of metastatic colon cancer currently in remission. No prior history of sebaceous carcinoma. History of multiple nonmelanoma skin cancers including multiple squamous cell carcinomas treated previously in his lower legs with curette, Mohs surgery, liquid nitrogen at outside hospital     11/17 right lateral forehead basal cell carcinoma status post Mohs  11/17 left mid helix basal cell carcinoma status post Mohs  1/18 Efudex face and scalp  2/18 malignant melanoma right mid lateral back status post wide local excision by Zac Calloway on 3/1/18.  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 status post wide local excision  4/18 right mid lateral back basal cell carcinoma status post curettage  7/18 left dorsal distal forearm nodular basal cell carcinoma treated with curettage  7/18 left thenar wrist well-differentiated squamous cell carcinoma status post curettage  7/18 mid posterior neck superficial basal cell carcinoma status post curettage  9/18 left mid thenar forearm superficial basal cell carcinoma status post curettage  9/18 left chest superficial basal cell carcinoma status post curettage  9/18 right lower back superficial basal cell carcinoma status post curettage  9/18 right lower back inferior superficial basal cell carcinoma status post curettage  11/18 left dorsal hand superficial squamous cell carcinoma status post curettage  11/18 left thenar forearm nodular basal cell carcinoma status post curettage  11/18 right scapular back superficial basal cell carcinoma status post curettage  1/19 left chest superficial squamous cell carcinoma status post curettage  1/19 right lateral supra-brow at Mohs site-actinic keratosis  1/19 right mid cheek superficial basal cell carcinoma status post Mohs 3/19  4/19 left scapular tip dysplastic nevus with moderate dysplasia, completely excised  4/19 right anterior forearm nodular basal cell carcinoma status post curettage  4/19 left ala hypertrophic actinic keratosis  4/19 left ulnar forearm squamous cell carcinoma status post curettage  4/19 right posterior vertex moderately differentiated squamous cell carcinoma status post curettage-punch biopsy 6/19 with no evidence of recurrence  6/19 right posterior shoulder superficial basal cell carcinoma status post curettage  6/19 left posterior neck nodular basal cell carcinoma status post curettage  9/19 right posterior shoulder nodular basal cell carcinoma status post curettage  6/20 right proximal shoulder superficial basal cell carcinoma status post curettage  6/20 left anterior shoulder actinic keratosis status post curettage  6/20 left lateral earlobe nodular basal cell carcinoma status post Mohs  12/20 left mid supra brow squamous cell carcinoma in situ-status post Efudex  12/20 right helix superficial squamous cell carcinoma-resolved after Efudex  12/20 right cheek above mandible nodular basal cell carcinoma-Mohs deferred due to Efudex-completed 4/21 12/20 right anterior forearm squamous cell carcinoma in situ status post curettage  12/20 left lower back squamous cell carcinoma in situ status post curettage  4/21 left thenar forearm well differentiated squamous cell carcinoma status post curettage  4/21 left mid upper arm basal cell carcinoma, superficial status post curettage  4/21 left posterior neck superficial basal cell carcinoma status post curettage  5/22 left mandible infiltrating squamous cell carcinoma status post Mohs  8/22 right mid neck hypertrophic actinic keratosis status post curettage        2017-current acitretin 25 mg every other day chemo prevention. 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 These resolved on acitretin and with intralesional 5-fluorouracil. Lichen planus- clobetasol, intralesional Kenalog, acitretin    Review of Systems:  Constitutional: Reports general sense of well-being       Past Medical History, Surgical History, Family History, Medications and Allergies reviewed. Social History:   Social History     Socioeconomic History    Marital status:      Spouse name: Not on file    Number of children: Not on file    Years of education: Not on file    Highest education level: Not on file   Occupational History    Not on file   Tobacco Use    Smoking status: Never    Smokeless tobacco: Never   Vaping Use    Vaping Use: Never used   Substance and Sexual Activity    Alcohol use: Yes    Drug use: Not on file    Sexual activity: Not on file   Other Topics Concern    Not on file   Social History Narrative    Not on file     Social Determinants of Health     Financial Resource Strain: Not on file   Food Insecurity: Not on file   Transportation Needs: Not on file   Physical Activity: Not on file   Stress: Not on file   Social Connections: Not on file   Intimate Partner Violence: Not on file   Housing Stability: Not on file       Physical Examination       -General: Well-appearing, NAD  1. Normal affect.   Total body skin exam including scalp, face, neck, chest, abdomen, back, bilateral upper extremities, bilateral lower extremities, ocular conjunctiva, external lips, and nails was performed. Examination normal unless stated below. Underwear area not examined. Scattered on the trunk and extremities are multiple well-defined round and oval symmetric smoothly-bordered uniformly brown macules and papules. Scattered gritty erythematous papules across his shoulders, chest.  Good improvement to actinic keratoses left dorsal forearm, left dorsal hand. Xerosis of lips. Granuloma fascia left cheek previously biopsied  Multiple well-healed scars no sign of recurrence  Multiple erythematous papules bilateral lower legs with superficial scale-lichen planus versus squamous cell carcinoma      Assessment and Plan     1. AK (actinic keratosis) -10-chest and shoulders, left dorsal hand after the risks, complications, and alternatives were explained to the patient, including risk of blister formation, discomfort, scar, hypopigmentation, patient elected to proceed with cryotherapy. Lesion(s) were treated w/ liquid nitrogen using a Cryogun. 1 freeze thaw cycle was performed with a freeze time of 1-5 seconds. There were no immediate complications. Wound care instructions were discussed with the patient. 2. Lichen planus -chronic problem, currently flaring-clobetasol cream twice daily for the next 2 to 3 weeks with recheck in the next month. If lesions do not improve/resolve consider biopsy   3.  Benign nevus - Benign acquired melanocytic nevi  -Recommend monthly self skin exams   -Educated regarding the ABCDEs of melanoma detection   -Call for any new/changing moles or concerning lesions  -Reviewed sun protective behavior -- sun avoidance during the peak hours of the day, sun-protective clothing (including hat and sunglasses), sunscreen use (water resistant, broad spectrum, SPF at least 30, need for reapplication every 2 to 3 hours), avoidance of tanning beds      4. High risk medication use-continue acitretin 25 mg p.o. daily for both chemoprevention and lichen planus. Obtain outside hospital labs from primary care doctor   5. History of malignant melanoma - No evidence of recurrence. Discussed risk of subsequent skin cancers and increased risk of melanoma. Reviewed importance of monitoring skin for change and sun protection with hats and sunscreen, as well as sun avoidance. 6. History of nonmelanoma skin cancer - No evidence of recurrence. Discussed risk of subsequent skin cancers and increased risk of melanoma. Reviewed importance of monitoring skin for change and sun protection with hats and sunscreen, as well as sun avoidance.

## 2023-01-12 NOTE — TELEPHONE ENCOUNTER
Reached out to [facility] office @ [phone]  Requesting most recent LFT/Renal Function and cholesterol panel  They had stated most recent was from Dec. 2022 and will fax it over to us

## 2023-01-18 NOTE — TELEPHONE ENCOUNTER
[facility] is calling. The pt is requesting a refill of Clobetasol. They have never filled it there before. Please send a new script to the pharmacy. Phone number is [phone]. Fax number is [phone].

## 2023-01-19 RX ORDER — CLOBETASOL PROPIONATE 0.5 MG/G
CREAM TOPICAL
Qty: 60 G | Refills: 1 | Status: SHIPPED | OUTPATIENT
Start: 2023-01-19

## 2023-02-09 ENCOUNTER — OFFICE VISIT (OUTPATIENT)
Dept: DERMATOLOGY | Age: 83
End: 2023-02-09

## 2023-02-09 DIAGNOSIS — L57.0 AK (ACTINIC KERATOSIS): Primary | ICD-10-CM

## 2023-02-09 DIAGNOSIS — Z79.899 HIGH RISK MEDICATION USE: ICD-10-CM

## 2023-02-09 DIAGNOSIS — L43.9 LICHEN PLANUS: ICD-10-CM

## 2023-02-09 DIAGNOSIS — Z85.828 HISTORY OF NONMELANOMA SKIN CANCER: ICD-10-CM

## 2023-02-09 DIAGNOSIS — L82.1 SEBORRHEIC KERATOSES: ICD-10-CM

## 2023-02-09 NOTE — PROGRESS NOTES
[facility]) Dermatology  Saturnino Dexter M.D.  [phone]       Jm Sanders  1940    79 y.o. male     Date of Visit: 2/9/2023    Chief Complaint:   Chief Complaint   Patient presents with    Skin Lesion        I was asked to see this patient by Dr. Pineda ref. provider found. History of Present Illness:  1. 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 Patient continues on acitretin 25 mg every other day, which he tolerates with mild xerosis of his lips. Has used clobetasol cream twice daily for the last several weeks to lichen planus on his lower legs-many larger lesions now relatively flat with postinflammatory erythema. Actinic keratoses dorsal hands, left anterior lower leg, right upper back. Dry but not itching, bleeding. Asymptomatic    Multiple nevi. Patient has not noticed any new or changing pigmented lesions. Stable in size, shape, color. Not itching, bleeding. Skin history:  History of metastatic colon cancer currently in remission. No prior history of sebaceous carcinoma. History of multiple nonmelanoma skin cancers including multiple squamous cell carcinomas treated previously in his lower legs with curette, Mohs surgery, liquid nitrogen at outside hospital     11/17 right lateral forehead basal cell carcinoma status post Mohs  11/17 left mid helix basal cell carcinoma status post Mohs  1/18 Efudex face and scalp  2/18 malignant melanoma right mid lateral back status post wide local excision by Salud Hopkins on 3/1/18.  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 status post wide local excision  4/18 right mid lateral back basal cell carcinoma status post curettage  7/18 left dorsal distal forearm nodular basal cell carcinoma treated with curettage  7/18 left thenar wrist well-differentiated squamous cell carcinoma status post curettage  7/18 mid posterior neck superficial basal cell carcinoma status post curettage  9/18 left mid thenar forearm superficial basal cell carcinoma status post curettage  9/18 left chest superficial basal cell carcinoma status post curettage  9/18 right lower back superficial basal cell carcinoma status post curettage  9/18 right lower back inferior superficial basal cell carcinoma status post curettage  11/18 left dorsal hand superficial squamous cell carcinoma status post curettage  11/18 left thenar forearm nodular basal cell carcinoma status post curettage  11/18 right scapular back superficial basal cell carcinoma status post curettage  1/19 left chest superficial squamous cell carcinoma status post curettage  1/19 right lateral supra-brow at Mohs site-actinic keratosis  1/19 right mid cheek superficial basal cell carcinoma status post Mohs 3/19  4/19 left scapular tip dysplastic nevus with moderate dysplasia, completely excised  4/19 right anterior forearm nodular basal cell carcinoma status post curettage  4/19 left ala hypertrophic actinic keratosis  4/19 left ulnar forearm squamous cell carcinoma status post curettage  4/19 right posterior vertex moderately differentiated squamous cell carcinoma status post curettage-punch biopsy 6/19 with no evidence of recurrence  6/19 right posterior shoulder superficial basal cell carcinoma status post curettage  6/19 left posterior neck nodular basal cell carcinoma status post curettage  9/19 right posterior shoulder nodular basal cell carcinoma status post curettage  6/20 right proximal shoulder superficial basal cell carcinoma status post curettage  6/20 left anterior shoulder actinic keratosis status post curettage  6/20 left lateral earlobe nodular basal cell carcinoma status post Mohs  12/20 left mid supra brow squamous cell carcinoma in situ-status post Efudex  12/20 right helix superficial squamous cell carcinoma-resolved after Efudex  12/20 right cheek above mandible nodular basal cell carcinoma-Mohs deferred due to Efudex-completed 4/21 12/20 right anterior forearm squamous cell carcinoma in situ status post curettage  12/20 left lower back squamous cell carcinoma in situ status post curettage  4/21 left thenar forearm well differentiated squamous cell carcinoma status post curettage  4/21 left mid upper arm basal cell carcinoma, superficial status post curettage  4/21 left posterior neck superficial basal cell carcinoma status post curettage  5/22 left mandible infiltrating squamous cell carcinoma status post Mohs  8/22 right mid neck hypertrophic actinic keratosis status post curettage        2017-current acitretin 25 mg every other day chemo prevention. 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 These resolved on acitretin and with intralesional 5-fluorouracil. Lichen planus- clobetasol, intralesional Kenalog, acitretin       Review of Systems:  Constitutional: Reports general sense of well-being       Past Medical History, Surgical History, Family History, Medications and Allergies reviewed.     Social History:   Social History     Socioeconomic History    Marital status:      Spouse name: Not on file    Number of children: Not on file    Years of education: Not on file    Highest education level: Not on file   Occupational History    Not on file   Tobacco Use    Smoking status: Never    Smokeless tobacco: Never   Vaping Use    Vaping Use: Never used   Substance and Sexual Activity    Alcohol use: Yes    Drug use: Not on file    Sexual activity: Not on file   Other Topics Concern    Not on file   Social History Narrative    Not on file     Social Determinants of Health     Financial Resource Strain: Not on file   Food Insecurity: Not on file   Transportation Needs: Not on file   Physical Activity: Not on file   Stress: Not on file   Social Connections: Not on file   Intimate Partner Violence: Not on file   Housing Stability: Not on file       Physical Examination       -General: Well-appearing, NAD  Limited exam  Gritty erythematous papules dorsal hands, left anterior lower leg, right upper back. Scattered erythematous papules with overlying scale bilateral lower legs-previously noted lesions flat with postinflammatory erythema. Mild xerosis lips  Scattered hyperkeratotic stuck on papules back    Assessment and Plan     1. AK (actinic keratosis) -8-dorsal hands, left anterior lower leg, right upper back after the risks, complications, and alternatives were explained to the patient, including risk of blister formation, discomfort, scar, hypopigmentation, patient elected to proceed with cryotherapy. Lesion(s) were treated w/ liquid nitrogen using a Cryogun. 1 freeze thaw cycle was performed with a freeze time of 1-5 seconds. There were no immediate complications. Wound care instructions were discussed with the patient. 2. Lichen planus -continue clobetasol cream or ointment twice daily up to 2 weeks to any new area. Discussed treating until they are flat with postinflammatory erythema. 3. High risk medication use-continue acitretin 25 mg every other day   4. Seborrheic keratoses - Discussed underlying nature of seborrheic keratosis and low risk of malignancy. Treatment reserved for lesions that are itching, bleeding, growing or otherwise becoming bothersome. Discussed monitoring for change with reevaluation for changing lesions.      5. History of nonmelanoma skin cancer-total-body skin exam May sooner if needed

## 2023-05-01 NOTE — TELEPHONE ENCOUNTER
Medication refill - Acitretin caps 25 mg  Request 90 day supply   [facility] [address]  Ph. [phone]  Fax [phone]

## 2023-05-02 RX ORDER — ACITRETIN 25 MG/1
25 CAPSULE ORAL DAILY
Qty: 90 CAPSULE | Refills: 3 | Status: SHIPPED | OUTPATIENT
Start: 2023-05-02

## 2023-05-25 ENCOUNTER — OFFICE VISIT (OUTPATIENT)
Dept: DERMATOLOGY | Age: 83
End: 2023-05-25
Payer: MEDICARE

## 2023-05-25 DIAGNOSIS — C44.729 SCC (SQUAMOUS CELL CARCINOMA), LEG, LEFT: ICD-10-CM

## 2023-05-25 DIAGNOSIS — Z85.820 HISTORY OF MALIGNANT MELANOMA: ICD-10-CM

## 2023-05-25 DIAGNOSIS — L43.9 LICHEN PLANUS: ICD-10-CM

## 2023-05-25 DIAGNOSIS — Z85.828 HISTORY OF NONMELANOMA SKIN CANCER: ICD-10-CM

## 2023-05-25 DIAGNOSIS — L57.0 AK (ACTINIC KERATOSIS): Primary | ICD-10-CM

## 2023-05-25 DIAGNOSIS — Z79.899 HIGH RISK MEDICATION USE: ICD-10-CM

## 2023-05-25 PROCEDURE — 17000 DESTRUCT PREMALG LESION: CPT | Performed by: DERMATOLOGY

## 2023-05-25 PROCEDURE — 1123F ACP DISCUSS/DSCN MKR DOCD: CPT | Performed by: DERMATOLOGY

## 2023-05-25 PROCEDURE — 17003 DESTRUCT PREMALG LES 2-14: CPT | Performed by: DERMATOLOGY

## 2023-05-25 PROCEDURE — 99214 OFFICE O/P EST MOD 30 MIN: CPT | Performed by: DERMATOLOGY

## 2023-05-25 PROCEDURE — 17261 DSTRJ MAL LES T/A/L .6-1.0CM: CPT | Performed by: DERMATOLOGY

## 2023-05-31 LAB — DERMATOLOGY PATHOLOGY REPORT: ABNORMAL

## 2023-08-01 ENCOUNTER — OFFICE VISIT (OUTPATIENT)
Dept: DERMATOLOGY | Age: 83
End: 2023-08-01

## 2023-08-01 DIAGNOSIS — L82.0 SEBORRHEIC KERATOSES, INFLAMED: ICD-10-CM

## 2023-08-01 DIAGNOSIS — L82.1 SEBORRHEIC KERATOSES: ICD-10-CM

## 2023-08-01 DIAGNOSIS — Z85.820 HISTORY OF MALIGNANT MELANOMA: ICD-10-CM

## 2023-08-01 DIAGNOSIS — D48.5 NEOPLASM OF UNCERTAIN BEHAVIOR OF SKIN: Primary | ICD-10-CM

## 2023-08-01 DIAGNOSIS — Z79.899 HIGH RISK MEDICATION USE: ICD-10-CM

## 2023-08-01 DIAGNOSIS — L43.9 LICHEN PLANUS: ICD-10-CM

## 2023-08-01 DIAGNOSIS — Z85.828 HISTORY OF NONMELANOMA SKIN CANCER: ICD-10-CM

## 2023-08-01 DIAGNOSIS — L57.0 AK (ACTINIC KERATOSIS): ICD-10-CM

## 2023-08-01 DIAGNOSIS — C44.729 SCC (SQUAMOUS CELL CARCINOMA), LEG, LEFT: ICD-10-CM

## 2023-08-01 NOTE — PROGRESS NOTES
[facility]) Dermatology  Artem Khalil M.D.  [phone]       Landa Cooks  1940    79 y.o. male     Date of Visit: 8/1/2023    Chief Complaint:   Chief Complaint   Patient presents with    Skin Lesion        I was asked to see this patient by Dr. Pineda ref. provider found. History of Present Illness:  1. Follow-up-multiple lesions    Lichen planus has flared-multiple active lesions over his lower legs and arms, chest.  Mildly pruritic. No improvement with clobetasol. Patient has reduced his dose of acitretin and is only on 25 mg 3 times weekly, previous dose of 25 mg PO qd- did have xerosis of lips. On acitretin for both lichen planus and chemoprevention of squamous cell carcinomas. Inflamed seborrheic keratosis left abdomen-has increased in size, becoming pruritic. Actinic keratoses over his neck, lateral face. Have increased in size. Dry. Skin history:  History of metastatic colon cancer currently in remission. No prior history of sebaceous carcinoma. History of multiple nonmelanoma skin cancers including multiple squamous cell carcinomas treated previously in his lower legs with curette, Mohs surgery, liquid nitrogen at outside hospital     11/17 right lateral forehead basal cell carcinoma status post Mohs  11/17 left mid helix basal cell carcinoma status post Mohs  1/18 Efudex face and scalp  2/18 malignant melanoma right mid lateral back status post wide local excision by Pratik Xavier on 3/1/18.  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

## 2023-08-07 LAB — DERMATOLOGY PATHOLOGY REPORT: ABNORMAL

## 2023-08-10 RX ORDER — FLUOROURACIL 50 MG/G
CREAM TOPICAL
Qty: 40 G | Refills: 0 | Status: SHIPPED | OUTPATIENT
Start: 2023-08-10

## 2023-09-14 ENCOUNTER — OFFICE VISIT (OUTPATIENT)
Dept: DERMATOLOGY | Age: 83
End: 2023-09-14

## 2023-09-14 DIAGNOSIS — Z79.899 HIGH RISK MEDICATION USE: ICD-10-CM

## 2023-09-14 DIAGNOSIS — D48.5 NEOPLASM OF UNCERTAIN BEHAVIOR OF SKIN: Primary | ICD-10-CM

## 2023-09-14 DIAGNOSIS — L43.9 LICHEN PLANUS: ICD-10-CM

## 2023-09-14 DIAGNOSIS — Z85.828 HISTORY OF NONMELANOMA SKIN CANCER: ICD-10-CM

## 2023-09-14 DIAGNOSIS — L57.0 ACTINIC KERATOSIS TREATED WITH TOPICAL FLUOROURACIL (5FU): ICD-10-CM

## 2023-09-14 DIAGNOSIS — D04.39 SQUAMOUS CELL CARCINOMA IN SITU (SCCIS) OF SKIN OF FOREHEAD: ICD-10-CM

## 2023-09-14 DIAGNOSIS — Z85.820 HISTORY OF MALIGNANT MELANOMA: ICD-10-CM

## 2023-09-14 NOTE — PROGRESS NOTES
[facility]) Dermatology  Aimee Almeida M.D.  [phone]       Deejay Siddiqi  1940    79 y.o. male     Date of Visit: 9/14/2023    Chief Complaint:   Chief Complaint   Patient presents with    Skin Lesion     Recheck spots on bilateral legs        I was asked to see this patient by Dr. Pineda ref. provider found. History of Present Illness:  1. Presents today for [id number] last month and lichen planus flaring. Treated with intralesional Kenalog with excellent improvement. Most lesions treated resolved    Squamous cell carcinoma left anterior lower leg had residual squamous cell, again treated with curette. Recheck today-if lesion has not not resolved or recurs, will need Mohs    Treated actinic keratosis vertex of scalp and squamous cell carcinoma in situ left medial supra brow with Efudex. Did develop erythema, crust, desquamation, but is healing without difficulty    Increased his dose of acitretin to 25 mg daily. Had reduced down to 25 mg every other day prior to flare of lichen planus. Does develop xerosis of his lips on higher dose, which he does not like. Notes new tender papule right lower lateral leg. Bothersome at night if his other foot touches the site. No discrete more keratotic papule at that area. Skin history:  History of metastatic colon cancer currently in remission. No prior history of sebaceous carcinoma. History of multiple nonmelanoma skin cancers including multiple squamous cell carcinomas treated previously in his lower legs with curette, Mohs surgery, liquid nitrogen at outside hospital     11/17 right lateral forehead basal cell carcinoma status post Mohs  11/17 left mid helix basal cell carcinoma status post Mohs  1/18 Efudex face and scalp  2/18 malignant melanoma right mid lateral back status post wide local excision by Julianna Valencia on 3/1/18.  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

## 2023-09-19 LAB — DERMATOLOGY PATHOLOGY REPORT: NORMAL

## 2023-10-19 ENCOUNTER — PROCEDURE VISIT (OUTPATIENT)
Dept: DERMATOLOGY | Age: 83
End: 2023-10-19
Payer: MEDICARE

## 2023-10-19 DIAGNOSIS — Z85.820 HISTORY OF MALIGNANT MELANOMA: ICD-10-CM

## 2023-10-19 DIAGNOSIS — Z79.899 HIGH RISK MEDICATION USE: ICD-10-CM

## 2023-10-19 DIAGNOSIS — Z85.828 HISTORY OF NONMELANOMA SKIN CANCER: ICD-10-CM

## 2023-10-19 DIAGNOSIS — L43.9 LICHEN PLANUS: ICD-10-CM

## 2023-10-19 DIAGNOSIS — D48.5 NEOPLASM OF UNCERTAIN BEHAVIOR OF SKIN: Primary | ICD-10-CM

## 2023-10-19 DIAGNOSIS — L57.0 AK (ACTINIC KERATOSIS): ICD-10-CM

## 2023-10-19 PROCEDURE — 1123F ACP DISCUSS/DSCN MKR DOCD: CPT | Performed by: DERMATOLOGY

## 2023-10-19 PROCEDURE — 17000 DESTRUCT PREMALG LESION: CPT | Performed by: DERMATOLOGY

## 2023-10-19 PROCEDURE — 17261 DSTRJ MAL LES T/A/L .6-1.0CM: CPT | Performed by: DERMATOLOGY

## 2023-10-19 PROCEDURE — 11102 TANGNTL BX SKIN SINGLE LES: CPT | Performed by: DERMATOLOGY

## 2023-10-19 PROCEDURE — 99214 OFFICE O/P EST MOD 30 MIN: CPT | Performed by: DERMATOLOGY

## 2023-10-19 PROCEDURE — 69100 BIOPSY OF EXTERNAL EAR: CPT | Performed by: DERMATOLOGY

## 2023-10-19 PROCEDURE — 11103 TANGNTL BX SKIN EA SEP/ADDL: CPT | Performed by: DERMATOLOGY

## 2023-10-19 NOTE — PROGRESS NOTES
[facility]) Dermatology  Betty Angela M.D.  [phone]       Xiao Victor  1940    79 y.o. male     Date of Visit: 10/19/2023    Chief Complaint:   Chief Complaint   Patient presents with    Skin Lesion        I was asked to see this patient by Dr. Pineda ref. provider found. History of Present Illness:  1. TBSE    Multiple nevi. Patient has not noticed any new or changing pigmented lesions. Stable in size, shape, color. Not itching, bleeding. Recheck left leg- site of previous SCC treated w curette- has not healed, concerning for recurrent/ residual scc. Lichen planus-continues on acitretin 25 mg p.o. daily. Uses clobetasol to new areas of involvement. Biopsy performed right mid lateral lower leg 9/14/2023 due to concern about possible squamous cell carcinoma-biopsy returned as hypertrophic lichen planus-recommended clobetasol, patient did not treat right mid lateral lower leg very consistently with clobetasol because is hard for him to reach. We have previously used intralesional Kenalog for lichen planus which has been very effective. Acitretin for chemoprevention-patient has been on acitretin for chemoprevention since 2017. Presented initially with widespread squamous cell carcinomas on bilateral lower legs treated with acitretin as well as intralesional 5-fluorouracil. Patient with new progressive renal insufficiency-being followed by nephrology. BUN and creatinine have changed from 1.2 in 2020-1.8 in 2023. Skin history:  History of metastatic colon cancer currently in remission. No prior history of sebaceous carcinoma.      History of multiple nonmelanoma skin cancers including multiple squamous cell carcinomas treated previously in his lower legs with curette, Mohs surgery, liquid nitrogen at outside hospital     11/17 right lateral forehead basal cell carcinoma status post Mohs  11/17 left mid helix basal cell carcinoma status post Mohs  1/18 Efudex face and scalp  2/18

## 2023-10-30 LAB — DERMATOLOGY PATHOLOGY REPORT: ABNORMAL

## 2023-12-12 ENCOUNTER — TELEPHONE (OUTPATIENT)
Dept: DERMATOLOGY | Age: 83
End: 2023-12-12

## 2023-12-12 NOTE — TELEPHONE ENCOUNTER
Called and left message for patient to call back office. If patient returns call please offer 10 am on 12/14/23 if patient available.

## 2023-12-14 ENCOUNTER — OFFICE VISIT (OUTPATIENT)
Dept: DERMATOLOGY | Age: 83
End: 2023-12-14

## 2023-12-14 DIAGNOSIS — L57.0 ACTINIC KERATOSIS TREATED WITH TOPICAL FLUOROURACIL (5FU): ICD-10-CM

## 2023-12-14 DIAGNOSIS — Z85.828 HISTORY OF NONMELANOMA SKIN CANCER: ICD-10-CM

## 2023-12-14 DIAGNOSIS — Z79.899 HIGH RISK MEDICATION USE: ICD-10-CM

## 2023-12-14 DIAGNOSIS — L57.0 AK (ACTINIC KERATOSIS): ICD-10-CM

## 2023-12-14 DIAGNOSIS — C44.619 BASAL CELL CARCINOMA (BCC) OF LEFT UPPER ARM: Primary | ICD-10-CM

## 2023-12-14 DIAGNOSIS — I87.2 VENOUS STASIS DERMATITIS OF RIGHT LOWER EXTREMITY: ICD-10-CM

## 2023-12-14 DIAGNOSIS — Z85.820 HISTORY OF MALIGNANT MELANOMA: ICD-10-CM

## 2023-12-14 DIAGNOSIS — L43.9 LICHEN PLANUS: ICD-10-CM

## 2023-12-14 DIAGNOSIS — D48.5 NEOPLASM OF UNCERTAIN BEHAVIOR OF SKIN: ICD-10-CM

## 2023-12-14 NOTE — PROGRESS NOTES
distal forearm nodular basal cell carcinoma treated with curettage  7/18 left thenar wrist well-differentiated squamous cell carcinoma status post curettage  7/18 mid posterior neck superficial basal cell carcinoma status post curettage  9/18 left mid thenar forearm superficial basal cell carcinoma status post curettage  9/18 left chest superficial basal cell carcinoma status post curettage  9/18 right lower back superficial basal cell carcinoma status post curettage  9/18 right lower back inferior superficial basal cell carcinoma status post curettage  11/18 left dorsal hand superficial squamous cell carcinoma status post curettage  11/18 left thenar forearm nodular basal cell carcinoma status post curettage  11/18 right scapular back superficial basal cell carcinoma status post curettage  1/19 left chest superficial squamous cell carcinoma status post curettage  1/19 right lateral supra-brow at Mohs site-actinic keratosis  1/19 right mid cheek superficial basal cell carcinoma status post Mohs 3/19  4/19 left scapular tip dysplastic nevus with moderate dysplasia, completely excised  4/19 right anterior forearm nodular basal cell carcinoma status post curettage  4/19 left ala hypertrophic actinic keratosis  4/19 left ulnar forearm squamous cell carcinoma status post curettage  4/19 right posterior vertex moderately differentiated squamous cell carcinoma status post curettage-punch biopsy 6/19 with no evidence of recurrence  6/19 right posterior shoulder superficial basal cell carcinoma status post curettage  6/19 left posterior neck nodular basal cell carcinoma status post curettage  9/19 right posterior shoulder nodular basal cell carcinoma status post curettage  6/20 right proximal shoulder superficial basal cell carcinoma status post curettage  6/20 left anterior shoulder actinic keratosis status post curettage  6/20 left lateral earlobe nodular basal cell carcinoma status post Mohs  12/20 left mid supra brow

## 2023-12-22 ENCOUNTER — TELEPHONE (OUTPATIENT)
Dept: DERMATOLOGY | Age: 83
End: 2023-12-22

## 2023-12-26 DIAGNOSIS — C44.121: Primary | ICD-10-CM

## 2023-12-26 NOTE — TELEPHONE ENCOUNTER
Informed patient of biopsy results. Patient verbalized understanding. Referral placed to Dr. Yaritza Fraire office for [address] [facility].

## 2023-12-26 NOTE — PROGRESS NOTES
Informed patient of biopsy results. Patient verbalized understanding. Referral placed to Dr. Alexandra Graves's office for [address] [place] [address].

## 2023-12-28 ENCOUNTER — TELEPHONE (OUTPATIENT)
Dept: DERMATOLOGY | Age: 83
End: 2023-12-28

## 2023-12-28 NOTE — TELEPHONE ENCOUNTER
Hazel Salinas from Dr. Antoinette Dacosta office pt was referred to them by Dr. Don Delay they would like a photo of the area sent to her email Gregory [email or web address]. [email or web address]  [phone]

## 2024-01-08 RX ORDER — ACITRETIN 25 MG/1
25 CAPSULE ORAL DAILY
Qty: 90 CAPSULE | Refills: 3 | Status: ACTIVE | OUTPATIENT
Start: 2024-01-08

## 2024-01-15 ENCOUNTER — TELEPHONE (OUTPATIENT)
Dept: DERMATOLOGY | Age: 84
End: 2024-01-15

## 2024-01-15 RX ORDER — ACITRETIN 25 MG/1
25 CAPSULE ORAL DAILY
Qty: 90 CAPSULE | Refills: 3 | Status: ACTIVE | OUTPATIENT
Start: 2024-01-15

## 2024-01-16 NOTE — TELEPHONE ENCOUNTER
Submitted PA for Acitretin  [facility]: [id number]   STATUS: Approved today  Your request has been approved. This approval authorizes your coverage from 01/01/2024 - 01/15/2025

## 2024-02-15 ENCOUNTER — OFFICE VISIT (OUTPATIENT)
Dept: DERMATOLOGY | Age: 84
End: 2024-02-15

## 2024-02-15 DIAGNOSIS — Z85.820 HISTORY OF MALIGNANT MELANOMA: ICD-10-CM

## 2024-02-15 DIAGNOSIS — D48.5 NEOPLASM OF UNCERTAIN BEHAVIOR OF SKIN: Primary | ICD-10-CM

## 2024-02-15 DIAGNOSIS — Z79.899 HIGH RISK MEDICATION USE: ICD-10-CM

## 2024-02-15 DIAGNOSIS — L43.9 LICHEN PLANUS: ICD-10-CM

## 2024-02-15 DIAGNOSIS — Z85.828 HISTORY OF NONMELANOMA SKIN CANCER: ICD-10-CM

## 2024-02-15 NOTE — PROGRESS NOTES
[facility]) Dermatology  Marisa Spicer M.D.  [phone]       Nick Mistry  1940    79 y.o. male     Date of Visit: 5/12/2022    Chief Complaint:   Chief Complaint   Patient presents with    Skin Lesion        I was asked to see this patient by Dr. Pineda ref. provider found. History of Present Illness:  1. Total-body skin exam    Multiple nevi-stable in size, shape, color. Has not noticed any new or changing pigmented lesions. Lichen planus lower legs-treats with clobetasol cream or ointment twice daily as needed for flares-has been treating new plaque right mid anterior lower leg for the last several weeks with no improvement. Other lesions have improved. 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 Continues on acitretin 25 mg every other day-good reduction in squamous cell carcinomas, patient does not like associated xerosis of lips    Skin history:  History of metastatic colon cancer currently in remission.  No prior history of sebaceous carcinoma.     History of multiple nonmelanoma skin cancers including multiple squamous cell carcinomas treated previously in his lower legs with curette, Mohs surgery, liquid nitrogen at outside hospital     11/17 right lateral forehead basal cell carcinoma status post Mohs  11/17 left mid helix basal cell carcinoma status post Mohs  1/18 Efudex face and scalp  2/18 malignant melanoma right mid lateral back status post wide local excision by Jesus Gibson on 3/1/18.  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 status post wide local excision  4/18 right mid lateral back basal cell carcinoma status post curettage  7/18 left dorsal distal forearm nodular basal cell carcinoma treated with curettage  7/18 left thenar wrist well-differentiated squamous cell carcinoma status post curettage  7/18 mid posterior neck superficial basal cell carcinoma status post curettage  9/18 left mid thenar forearm superficial basal cell carcinoma status post curettage  9/18 left chest superficial basal cell carcinoma status post curettage  9/18 right lower back superficial basal cell carcinoma status post curettage  9/18 right lower back inferior superficial basal cell carcinoma status post curettage  11/18 left dorsal hand superficial squamous cell carcinoma status post curettage  11/18 left thenar forearm nodular basal cell carcinoma status post curettage  11/18 right scapular back superficial basal cell carcinoma status post curettage  1/19 left chest superficial squamous cell carcinoma status post curettage  1/19 right lateral supra-brow at Mohs site-actinic keratosis  1/19 right mid cheek superficial basal cell carcinoma status post Mohs 3/19  4/19 left scapular tip dysplastic nevus with moderate dysplasia, completely excised  4/19 right anterior forearm nodular basal cell carcinoma status post curettage  4/19 left ala hypertrophic actinic keratosis  4/19 left ulnar forearm squamous cell carcinoma status post curettage  4/19 right posterior vertex moderately differentiated squamous cell carcinoma status post curettage-punch biopsy 6/19 with no evidence of recurrence  6/19 right posterior shoulder superficial basal cell carcinoma status post curettage  6/19 left posterior neck nodular basal cell carcinoma status post curettage  9/19 right posterior shoulder nodular basal cell carcinoma status post curettage  6/20 right proximal shoulder superficial basal cell carcinoma status post curettage  6/20 left anterior shoulder actinic keratosis status post curettage  6/20 left lateral earlobe nodular basal cell carcinoma status post Mohs  12/20 left mid supra brow squamous cell carcinoma in situ-status post Efudex  12/20 right helix superficial squamous cell carcinoma-resolved after Efudex  12/20 right cheek above mandible nodular basal cell carcinoma-Mohs deferred due to Efudex-completed 4/21 12/20 right anterior forearm squamous cell carcinoma in situ status post curettage  12/20 left lower back squamous cell carcinoma in situ status post curettage  4/21 left thenar forearm well differentiated squamous cell carcinoma status post curettage  4/21 left mid upper arm basal cell carcinoma, superficial status post curettage  4/21 left posterior neck superficial basal cell carcinoma status post curettage        2017-current acitretin 25 mg every other day chemo prevention.  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  These resolved on acitretin and with intralesional 5-fluorouracil.     Lichen planus- clobetasol, intralesional Kenalog, acitretin       Review of Systems:  Constitutional: Reports general sense of well-being       Past Medical History, Surgical History, Family History, Medications and Allergies reviewed. Social History:   Social History     Socioeconomic History    Marital status:      Spouse name: Not on file    Number of children: Not on file    Years of education: Not on file    Highest education level: Not on file   Occupational History    Not on file   Tobacco Use    Smoking status: Never Smoker    Smokeless tobacco: Never Used   Vaping Use    Vaping Use: Never used   Substance and Sexual Activity    Alcohol use: Yes    Drug use: Not on file    Sexual activity: Not on file   Other Topics Concern    Not on file   Social History Narrative    Not on file     Social Determinants of Health     Financial Resource Strain:     Difficulty of Paying Living Expenses: Not on file   Food Insecurity:     Worried About Running Out of Food in the Last Year: Not on file    Jorge of Food in the Last Year: Not on file   Transportation Needs:     Lack of Transportation (Medical): Not on file    Lack of Transportation (Non-Medical):  Not on file   Physical Activity:     Days of Exercise per Week: Not on file    Minutes of Exercise per Session: Not on file   Stress:     Feeling of Stress : Not on file   Social Connections:     Frequency of Communication with Friends and Family: Not on file    Frequency of Social Gatherings with Friends and Family: Not on file    Attends Voodoo Services: Not on file    Active Member of Clubs or Organizations: Not on file    Attends Club or Organization Meetings: Not on file    Marital Status: Not on file   Intimate Partner Violence:     Fear of Current or Ex-Partner: Not on file    Emotionally Abused: Not on file    Physically Abused: Not on file    Sexually Abused: Not on file   Housing Stability:     Unable to Pay for Housing in the Last Year: Not on file    Number of [place] in the Last Year: Not on file    Unstable Housing in the Last Year: Not on file       Physical Examination       -General: Well-appearing, NAD  1. Normal affect. Total body skin exam including scalp, face, neck, chest, abdomen, back, bilateral upper extremities, bilateral lower extremities, ocular conjunctiva, external lips, and nails was performed. Examination normal unless stated below. Underwear area not examined. Scattered on the trunk and extremities are multiple well-defined round and oval symmetric smoothly-bordered uniformly brown macules and papules. Scattered gritty erythematous papules scalp, dorsal hands, forearms, upper back  Erythematous papules scattered on lower legs consistent with lichen planus  Granuloma faciale previously biopsied left malar cheek          Right medial lower leg 1.3 cm thin pink plaque-rule out squamous cell carcinoma  Left mandible 6 mm firm erythematous papule rule out squamous cell carcinoma/basal cell carcinoma    Assessment and Plan     1. Neoplasm of uncertain behavior of skin -right medial lower leg, left mandible--Discussed possible diagnosis. Patient agreeable to biopsy. Verbal consent obtained after risks (infection, bleeding, scar), benefits and alternatives explained. -Area(s) to be biopsied were marked with a surgical pen. Site(s) were cleansed with alcohol. Local anesthesia achieved with 1% lidocaine with epinephrine/sodium bicarbonate. Shave biopsy performed with a razor blade. Hemostasis was achieved with aluminum chloride. The wound(s) were dressed with petrolatum and covered with a bandage. Specimen(s) sent to pathology. Pt educated re: risk of bleeding, infection, scar and wound care instructions. 2. AK (actinic keratosis) After the risks, complications, and alternatives were explained to the patient, including risk of blister formation, discomfort, scar, hypopigmentation, patient elected to proceed with cryotherapy. 12 lesion(s) on the face, scalp, hands, back, arms were treated w/ liquid nitrogen using a Cryogun. 1 freeze thaw cycle was performed with a freeze time of 2-5 seconds. There were no immediate complications. Wound care instructions were discussed with the patient. 3. Lichen planus-continue clobetasol cream or ointment twice daily for new areas of involvement   4. High risk medication use-continue acitretin 25 mg every other day   5. Granuloma faciale and continue tacrolimus 0.1% ointment as needed   6. History of malignant melanoma - No evidence of recurrence. Discussed risk of subsequent skin cancers and increased risk of melanoma. Reviewed importance of monitoring skin for change and sun protection with hats and sunscreen, as well as sun avoidance. 7. History of nonmelanoma skin cancer - No evidence of recurrence. Discussed risk of subsequent skin cancers and increased risk of melanoma. Reviewed importance of monitoring skin for change and sun protection with hats and sunscreen, as well as sun avoidance. 1 = Total assistance

## 2024-02-15 NOTE — PROGRESS NOTES
carcinoma-Mohs deferred due to Efudex-completed 4/21 12/20 right anterior forearm squamous cell carcinoma in situ status post curettage  12/20 left lower back squamous cell carcinoma in situ status post curettage  4/21 left thenar forearm well differentiated squamous cell carcinoma status post curettage  4/21 left mid upper arm basal cell carcinoma, superficial status post curettage  4/21 left posterior neck superficial basal cell carcinoma status post curettage  5/22 left mandible infiltrating squamous cell carcinoma status post Mohs  8/22 right mid neck hypertrophic actinic keratosis status post curettage  5/23 left anterior lower leg well-differentiated squamous cell carcinoma status post curettage  8/23 left anterior lower leg squamous cell carcinoma status post curettage  8/23 left medial supra brow squamous cell carcinoma in situ status post Efudex  10/23 right proximal lateral lower leg superficially invasive squamous cell carcinoma well-differentiated status post curettage  10/23 left posterior upper arm superficial basal cell carcinoma  12/23 left lateral canthus squamous cell carcinoma status post Mohs (Fredi Rahman)           2017-current acitretin 25 mg every other day chemo prevention.  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  These resolved on acitretin and with intralesional 5-fluorouracil.     Lichen planus- clobetasol, intralesional Kenalog, acitretin    Review of Systems:  Constitutional: Reports general sense of well-being       Past Medical History, Surgical History, Family History, Medications and Allergies reviewed.    Social History:   Social History     Socioeconomic History    Marital status:      Spouse name: Not on file    Number of children: Not on file    Years of education: Not on file    Highest education level: Not on file   Occupational History    Not on file   Tobacco Use    Smoking status: Never    Smokeless

## 2024-02-23 DIAGNOSIS — C44.02 SQUAMOUS CELL CARCINOMA OF SKIN OF LOWER LIP: Primary | ICD-10-CM

## 2024-02-24 NOTE — RESULT ENCOUNTER NOTE
Pls correct pathology- should say \"right\" leg. Pls refer to Mohs for scc lower lip- raudel lloyd. Pls have patient use fluorouracil 5% cream BID for 2 weeks to AK on right lower leg- he has used before, but review instructions. OK to keep covered while treating.

## 2024-02-26 LAB
CLINICAL INFORMATION: NORMAL
CPT CODE: NORMAL
CPT DISCLAIMER: NORMAL
DEMOGRAPHIC HEADER: NORMAL
DIAGNOSIS CATEGORY: NORMAL
DIAGNOSIS: NORMAL
Lab: NORMAL
MICROSCOPIC EXAMINATION: NORMAL
PATHOLOGIST: NORMAL
PERFORMING LOCATION: NORMAL
PLACE OF SERVICE: NORMAL

## 2024-03-12 ENCOUNTER — TELEPHONE (OUTPATIENT)
Dept: DERMATOLOGY | Age: 84
End: 2024-03-12

## 2024-03-12 NOTE — TELEPHONE ENCOUNTER
Pt req sooner appt in addition to one in May due to spot on lower R shin which is looking a lot like previous serious aggressive areas. Other areas of concern as well. States usually seen on Thursdays for 1/2 hour.     [phone]

## 2024-03-26 RX ORDER — CLOBETASOL PROPIONATE 0.5 MG/G
CREAM TOPICAL
Qty: 60 G | Refills: 1 | Status: SHIPPED | OUTPATIENT
Start: 2024-03-26

## 2024-03-28 ENCOUNTER — OFFICE VISIT (OUTPATIENT)
Dept: DERMATOLOGY | Age: 84
End: 2024-03-28
Payer: MEDICARE

## 2024-03-28 DIAGNOSIS — D48.5 NEOPLASM OF UNCERTAIN BEHAVIOR OF SKIN: Primary | ICD-10-CM

## 2024-03-28 DIAGNOSIS — Z85.820 HISTORY OF MALIGNANT MELANOMA: ICD-10-CM

## 2024-03-28 DIAGNOSIS — L43.9 LICHEN PLANUS: ICD-10-CM

## 2024-03-28 DIAGNOSIS — C44.519 BASAL CELL CARCINOMA (BCC) OF ANTERIOR CHEST: ICD-10-CM

## 2024-03-28 DIAGNOSIS — Z79.899 HIGH RISK MEDICATION USE: ICD-10-CM

## 2024-03-28 DIAGNOSIS — L57.0 AK (ACTINIC KERATOSIS): ICD-10-CM

## 2024-03-28 PROCEDURE — 17000 DESTRUCT PREMALG LESION: CPT | Performed by: DERMATOLOGY

## 2024-03-28 PROCEDURE — 11102 TANGNTL BX SKIN SINGLE LES: CPT | Performed by: DERMATOLOGY

## 2024-03-28 PROCEDURE — 99214 OFFICE O/P EST MOD 30 MIN: CPT | Performed by: DERMATOLOGY

## 2024-03-28 PROCEDURE — 11103 TANGNTL BX SKIN EA SEP/ADDL: CPT | Performed by: DERMATOLOGY

## 2024-03-28 PROCEDURE — 17262 DSTRJ MAL LES T/A/L 1.1-2.0: CPT | Performed by: DERMATOLOGY

## 2024-03-28 PROCEDURE — 1123F ACP DISCUSS/DSCN MKR DOCD: CPT | Performed by: DERMATOLOGY

## 2024-03-28 NOTE — PROGRESS NOTES
[facility] Dermatology  Emily Malave M.D.  [phone]       Jus Garcia  1940    83 y.o. male     Date of Visit: 3/28/2024    Chief Complaint:   Chief Complaint   Patient presents with    Skin Lesion        I was asked to see this patient by Dr. Pineda ref. provider found.    History of Present Illness:  1. Urgent visit. 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  Patient has met with radiation and has upcoming radiation therapy to the area-estimated rate of recurrence approximately 20%.  Healing very well from reconstruction.    Scheduled urgent appointment because he has had some jaw pain and in the last 2 weeks developed a raised scaly papule along his right mandible.  Squamous cell carcinoma left lower cutaneous lip started with pain in that area which predated the development of the cutaneous papule that was biopsied.  Patient was concerned that he might be developing another squamous cell carcinoma on his mandible.    Also notes some tenderness right lower leg-have biopsied area of tenderness noted previously, patient has difficulty identifying exact area-states he mostly notices this at night when he is in bed.  Thinks he can identify this today.    Lichen planus has been flaring over his legs, a few areas on his arms as well.  Has been treating with clobetasol-thinner areas have responded fairly well.  Has reduced his dose of acitretin to 25 mg 4 times per week due to declining renal function.  Lichen planus less well-controlled.      Skin history:  History of metastatic colon cancer currently in remission.  No prior history of sebaceous carcinoma.     History of multiple nonmelanoma skin cancers including multiple squamous cell carcinomas treated previously in his lower legs with curette, Mohs surgery, liquid nitrogen at outside hospital     11/17 right

## 2024-04-02 LAB — DERMATOLOGY PATHOLOGY REPORT: ABNORMAL

## 2024-04-02 NOTE — RESULT ENCOUNTER NOTE
Pls contact patient- Basal cell right chest treated with curette at time of biopsy. SCCIS right mandible is very early- has not gone below the top layer of skin, however, given recent aggressive scc, will have raudel remove- pls refer to Mohs.     Other biopsies: AK below earlobe, no further therapy- will recheck next visit. All 3 biopsies lower legs show lichen planus- we can inject with kenalog, or he can use clobetasol after he has healed. There was no evidence of skin cancer in these biopsies.

## 2024-04-03 DIAGNOSIS — D04.39 SQUAMOUS CELL CARCINOMA IN SITU (SCCIS) OF SKIN OF JAWLINE: Primary | ICD-10-CM

## 2024-05-23 ENCOUNTER — OFFICE VISIT (OUTPATIENT)
Dept: DERMATOLOGY | Age: 84
End: 2024-05-23

## 2024-05-23 DIAGNOSIS — C44.519 BCC (BASAL CELL CARCINOMA), BACK: ICD-10-CM

## 2024-05-23 DIAGNOSIS — D48.5 NEOPLASM OF UNCERTAIN BEHAVIOR OF SKIN: ICD-10-CM

## 2024-05-23 DIAGNOSIS — L57.0 AK (ACTINIC KERATOSIS): ICD-10-CM

## 2024-05-23 DIAGNOSIS — Z85.820 HISTORY OF MALIGNANT MELANOMA: ICD-10-CM

## 2024-05-23 DIAGNOSIS — L43.9 LICHEN PLANUS: Primary | ICD-10-CM

## 2024-05-23 DIAGNOSIS — Z79.899 HIGH RISK MEDICATION USE: ICD-10-CM

## 2024-05-23 NOTE — PROGRESS NOTES
[facility] Dermatology  Emily Malave M.D.  [phone]       Jus OSORIO Radha  1940    83 y.o. male     Date of Visit: 5/23/2024    Chief Complaint:   Chief Complaint   Patient presents with    Lesion(s)        I was asked to see this patient by Dr. Pineda ref. provider found.    History of Present Illness:  1.  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  Completed radiation about 4 weeks ago.  Still with erythema, scale left lower cutaneous lip with superficial erosion.  Has a follow-up scheduled.    Lichen planus flaring on his lower legs-3 prior biopsies right lower leg at his last appointment all showed lichen planus.  Has tried clobetasol with no improvement.  Periodically very pruritic or painful.    Still taking acitretin-does have some renal insufficiency and is following with nephrology.  BUN and creatinine from 2/2426/1 0.42.  Does have some xerosis especially of lips from isotretinoin      Skin history:  History of metastatic colon cancer currently in remission.  No prior history of sebaceous carcinoma.     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

## 2024-05-29 LAB — DERMATOLOGY PATHOLOGY REPORT: ABNORMAL

## 2024-06-25 ENCOUNTER — OFFICE VISIT (OUTPATIENT)
Dept: DERMATOLOGY | Age: 84
End: 2024-06-25
Payer: MEDICARE

## 2024-06-25 DIAGNOSIS — Z85.820 HISTORY OF MALIGNANT MELANOMA: ICD-10-CM

## 2024-06-25 DIAGNOSIS — L57.0 ACTINIC KERATOSIS TREATED WITH TOPICAL FLUOROURACIL (5FU): ICD-10-CM

## 2024-06-25 DIAGNOSIS — L43.9 LICHEN PLANUS: Primary | ICD-10-CM

## 2024-06-25 DIAGNOSIS — Z85.828 HISTORY OF NONMELANOMA SKIN CANCER: ICD-10-CM

## 2024-06-25 PROCEDURE — 1123F ACP DISCUSS/DSCN MKR DOCD: CPT | Performed by: DERMATOLOGY

## 2024-06-25 PROCEDURE — 99214 OFFICE O/P EST MOD 30 MIN: CPT | Performed by: DERMATOLOGY

## 2024-06-25 PROCEDURE — 11901 INJECT SKIN LESIONS >7: CPT | Performed by: DERMATOLOGY

## 2024-06-25 NOTE — PROGRESS NOTES
deferred due to Efudex-completed 4/21 12/20 right anterior forearm squamous cell carcinoma in situ status post curettage  12/20 left lower back squamous cell carcinoma in situ status post curettage  4/21 left thenar forearm well differentiated squamous cell carcinoma status post curettage  4/21 left mid upper arm basal cell carcinoma, superficial status post curettage  4/21 left posterior neck superficial basal cell carcinoma status post curettage  5/22 left mandible infiltrating squamous cell carcinoma status post Mohs  8/22 right mid neck hypertrophic actinic keratosis status post curettage  5/23 left anterior lower leg well-differentiated squamous cell carcinoma status post curettage  8/23 left anterior lower leg squamous cell carcinoma status post curettage  8/23 left medial supra brow squamous cell carcinoma in situ status post Efudex  10/23 right proximal lateral lower leg superficially invasive squamous cell carcinoma well-differentiated status post curettage  10/23 left posterior upper arm superficial basal cell carcinoma  12/23 left lateral canthus squamous cell carcinoma status post Mohs (Fredi Rahman)  2/24 left lower cutaneous lip squamous cell carcinoma moderately differentiated-poorly differentiated with perineural invasion, status post Mohs and radiation  3/24 right angle of mandible squamous cell carcinoma in situ status post Mohs  3/24 right parasternal chest superficial basal cell carcinoma status post curettage           2017-current acitretin 25 mg every other day chemo prevention.  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  These resolved on acitretin and with intralesional 5-fluorouracil.     Lichen planus- clobetasol, intralesional Kenalog, acitretin            Review of Systems:  Constitutional: Reports general sense of well-being       Past Medical History, Surgical History, Family History, Medications and Allergies

## 2024-09-23 ENCOUNTER — TELEPHONE (OUTPATIENT)
Dept: DERMATOLOGY | Age: 84
End: 2024-09-23

## 2024-09-23 NOTE — TELEPHONE ENCOUNTER
FYI - Notes said 30 minutes in September but pt has bursitis in hip which he states is aggravated by getting in/out of car so r/s for a Thursday in October.

## 2024-10-10 ENCOUNTER — PROCEDURE VISIT (OUTPATIENT)
Dept: DERMATOLOGY | Age: 84
End: 2024-10-10

## 2024-10-10 DIAGNOSIS — D48.5 NEOPLASM OF UNCERTAIN BEHAVIOR OF SKIN: Primary | ICD-10-CM

## 2024-10-10 DIAGNOSIS — L43.9 LICHEN PLANUS: ICD-10-CM

## 2024-10-10 DIAGNOSIS — L57.0 AK (ACTINIC KERATOSIS): ICD-10-CM

## 2024-10-10 DIAGNOSIS — Z85.820 HISTORY OF MALIGNANT MELANOMA: ICD-10-CM

## 2024-10-10 DIAGNOSIS — Z85.828 HISTORY OF NONMELANOMA SKIN CANCER: ICD-10-CM

## 2024-10-10 DIAGNOSIS — Z79.899 HIGH RISK MEDICATION USE: ICD-10-CM

## 2024-10-10 RX ORDER — HYDROCODONE BITARTRATE AND ACETAMINOPHEN 5; 325 MG/1; MG/1
1 TABLET ORAL EVERY 6 HOURS PRN
COMMUNITY
Start: 2024-10-03

## 2024-10-10 NOTE — PROGRESS NOTES
[employer] (3/20/2024)    Received from  [employer],  [employer]    Social Connection and Isolation Panel [NHANES]     Frequency of Communication with Friends and Family: Three times a week     Frequency of Social Gatherings with Friends and Family: Not on file     Attends Mu-ism Services: More than 4 times per year     Active Member of Clubs or Organizations: No     Attends Club or Organization Meetings: Not on file     Marital Status:    Intimate Partner Violence: Not on file   Housing Stability: No Transportation Needs (9/18/2024)    Received from  [employer],  [employer],  [employer]    Yearly Questionnaire     Do you need any assistance with obtaining housing, meals, medication, transportation or medical equipment?: No     Assistance needed for:: Not on file       Physical Examination       -General: Well-appearing, NAD  1. Tbse as tolerated- difficulty positioning  Gritty erythematous papules right nasal sidewall, right neck.  Well-healed scars.  Erythematous slightly scaly papules bilateral lower legs-lichen planus, postinflammatory change at site of prior lichen planus that has resolved.    Left cheek 1 cm erythematous plaque with overlying crust (patient reports recently removing crust at home)        Assessment and Plan     1. Neoplasm of uncertain behavior of skin - left cheek- -Discussed possible diagnosis. Patient agreeable to biopsy. Verbal consent obtained after risks (infection, bleeding, scar), benefits and alternatives explained.   -Area(s) to be biopsied were marked with a surgical pen. Site(s) were cleansed with alcohol. Local anesthesia achieved with 1% lidocaine with epinephrine/sodium bicarbonate. 4mm punch biopsy was performed followed by placement of simple interrupted 5-0 nylon sutures. Specimen(s) sent to pathology. The wound(s) were dressed with petrolatum and covered with a bandage. Pt was educated re: risk of bleeding, infection, scar, wound care instructions and suture

## 2024-10-16 ENCOUNTER — TELEPHONE (OUTPATIENT)
Dept: DERMATOLOGY | Age: 84
End: 2024-10-16

## 2024-10-18 LAB — DERMATOLOGY PATHOLOGY REPORT: NORMAL

## 2025-01-09 ENCOUNTER — PROCEDURE VISIT (OUTPATIENT)
Age: 85
End: 2025-01-09

## 2025-01-09 DIAGNOSIS — L71.9 ROSACEA: ICD-10-CM

## 2025-01-09 DIAGNOSIS — Z79.899 HIGH RISK MEDICATION USE: ICD-10-CM

## 2025-01-09 DIAGNOSIS — D48.5 NEOPLASM OF UNCERTAIN BEHAVIOR OF SKIN: ICD-10-CM

## 2025-01-09 DIAGNOSIS — L57.0 AK (ACTINIC KERATOSIS): ICD-10-CM

## 2025-01-09 DIAGNOSIS — Z85.820 HISTORY OF MALIGNANT MELANOMA: ICD-10-CM

## 2025-01-09 DIAGNOSIS — L43.9 LICHEN PLANUS: Primary | ICD-10-CM

## 2025-01-09 DIAGNOSIS — Z85.828 HISTORY OF NONMELANOMA SKIN CANCER: ICD-10-CM

## 2025-01-09 RX ORDER — METRONIDAZOLE 7.5 MG/G
GEL TOPICAL
Qty: 45 G | Refills: 1 | Status: SHIPPED | OUTPATIENT
Start: 2025-01-09

## 2025-01-09 NOTE — PROGRESS NOTES
[facility] Dermatology  Emily Malave M.D.  [phone]       Jus Garcia  1940    84 y.o. male     Date of Visit: 1/9/2025    Chief Complaint:   Chief Complaint   Patient presents with    Skin Lesion        I was asked to see this patient by Dr. Pineda ref. provider found.    History of Present Illness:  1.   Lichen planus much improved.  Continues on acitretin 25 mg 3 times weekly-reduce dose from 4 times weekly due to elevated liver function tests.  Most recent liver function tests within normal limits.  Recent intralesional Kenalog to lower legs with excellent improvement, although has new area of involvement left proximal lower leg-asymptomatic.     Tolerating acitretin well.  Does have declining renal function, which will ultimately limit our use of acitretin.  On acitretin for both chemoprevention for multiple squamous cell carcinomas including recent high risk squamous cell carcinoma left lower cutaneous lip, also for lichen planus.    Widespread severe actinic damage.  Patient still recovering from recent sepsis, hospitalization, nursing stay.  Has a few new hypertrophic papules.    Multiple papules, some of which are crusted, lateral face.  Has been using clobetasol to his face.       Skin history:  History of metastatic colon cancer currently in remission.  No prior history of sebaceous carcinoma.     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

## 2025-01-14 LAB — DERMATOLOGY PATHOLOGY REPORT: NORMAL

## 2025-04-04 DIAGNOSIS — C44.90 SKIN CANCER: Primary | ICD-10-CM

## 2025-04-07 RX ORDER — ACITRETIN 25 MG/1
25 CAPSULE ORAL DAILY
Qty: 90 CAPSULE | Refills: 3 | Status: SHIPPED | OUTPATIENT
Start: 2025-04-07

## 2025-04-10 ENCOUNTER — OFFICE VISIT (OUTPATIENT)
Age: 85
End: 2025-04-10

## 2025-04-10 DIAGNOSIS — L43.9 LICHEN PLANUS: ICD-10-CM

## 2025-04-10 DIAGNOSIS — L71.9 ROSACEA: ICD-10-CM

## 2025-04-10 DIAGNOSIS — D22.9 BENIGN NEVUS: ICD-10-CM

## 2025-04-10 DIAGNOSIS — D48.5 NEOPLASM OF UNCERTAIN BEHAVIOR OF SKIN: Primary | ICD-10-CM

## 2025-04-10 DIAGNOSIS — Z79.899 HIGH RISK MEDICATION USE: ICD-10-CM

## 2025-04-10 DIAGNOSIS — Z85.820 HISTORY OF MALIGNANT MELANOMA: ICD-10-CM

## 2025-04-10 DIAGNOSIS — Z85.828 HISTORY OF NONMELANOMA SKIN CANCER: ICD-10-CM

## 2025-04-10 DIAGNOSIS — L57.0 ACTINIC KERATOSIS TREATED WITH TOPICAL FLUOROURACIL (5FU): ICD-10-CM

## 2025-04-10 NOTE — PROGRESS NOTES
new/changing moles or concerning lesions  -Reviewed sun protective behavior -- sun avoidance during the peak hours of the day, sun-protective clothing (including hat and sunglasses), sunscreen use (water resistant, broad spectrum, SPF at least 30, need for reapplication every 2 to 3 hours), avoidance of tanning beds      7. High risk medication use - -Edu re: dry lips/skin/eyes/nose, HA, arthralgias/myalgias, leukopenia, elev LFTs/lipids, IBD flare.   -   8. History of malignant melanoma - No evidence of recurrence. Discussed risk of subsequent skin cancers and increased risk of melanoma. Reviewed importance of monitoring skin for change and sun protection with hats and sunscreen, as well as sun avoidance.    9. History of nonmelanoma skin cancer - No evidence of recurrence. Discussed risk of subsequent skin cancers and increased risk of melanoma. Reviewed importance of monitoring skin for change and sun protection with hats and sunscreen, as well as sun avoidance.

## 2025-04-15 ENCOUNTER — TELEPHONE (OUTPATIENT)
Age: 85
End: 2025-04-15

## 2025-04-15 ENCOUNTER — RESULTS FOLLOW-UP (OUTPATIENT)
Age: 85
End: 2025-04-15

## 2025-04-15 DIAGNOSIS — C44.612 BASAL CELL CARCINOMA (BCC) OF RIGHT HAND: ICD-10-CM

## 2025-04-15 DIAGNOSIS — C44.42 SQUAMOUS CELL CARCINOMA OF SCALP: Primary | ICD-10-CM

## 2025-04-15 LAB — DERMATOLOGY PATHOLOGY REPORT: ABNORMAL

## 2025-04-15 RX ORDER — METRONIDAZOLE 7.5 MG/G
GEL TOPICAL
Qty: 45 G | Refills: 11 | Status: SHIPPED | OUTPATIENT
Start: 2025-04-15

## 2025-04-15 NOTE — RESULT ENCOUNTER NOTE
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

## 2025-04-15 NOTE — TELEPHONE ENCOUNTER
Patient called back to reschedule his appt from 5/22.  He needs a 30 min appt on Thursdays.  Please call [phone].

## 2025-04-23 ENCOUNTER — TELEPHONE (OUTPATIENT)
Age: 85
End: 2025-04-23

## 2025-04-23 NOTE — TELEPHONE ENCOUNTER
Patient called because he has not received his refill of acitretin 25 mg tablets.  He checked with [facility] and [facility] pharmacies and they do not have this.  Could this be sent asap to either one of these pharmacies?  Please advise where this is sent.  [phone]

## 2025-04-24 NOTE — TELEPHONE ENCOUNTER
LVM explaining that the PA has gone through and that Keyla should be reaching out to him to set up a delivery through [facility] pharmacy, Advised to call back if there are issues/questions.

## 2025-06-18 ENCOUNTER — OFFICE VISIT (OUTPATIENT)
Age: 85
End: 2025-06-18

## 2025-06-18 DIAGNOSIS — C44.42 SQUAMOUS CELL CARCINOMA OF SCALP: ICD-10-CM

## 2025-06-18 DIAGNOSIS — L71.9 ROSACEA: ICD-10-CM

## 2025-06-18 DIAGNOSIS — Z85.820 HISTORY OF MALIGNANT MELANOMA: ICD-10-CM

## 2025-06-18 DIAGNOSIS — D22.9 BENIGN NEVUS: ICD-10-CM

## 2025-06-18 DIAGNOSIS — D04.5 SQUAMOUS CELL CARCINOMA IN SITU (SCCIS) OF SKIN OF CHEST: ICD-10-CM

## 2025-06-18 DIAGNOSIS — L57.0 AK (ACTINIC KERATOSIS): ICD-10-CM

## 2025-06-18 DIAGNOSIS — D48.5 NEOPLASM OF UNCERTAIN BEHAVIOR OF SKIN: ICD-10-CM

## 2025-06-18 DIAGNOSIS — C44.612 BCC (BASAL CELL CARCINOMA), HAND, RIGHT: Primary | ICD-10-CM

## 2025-06-18 DIAGNOSIS — C44.729 SCC (SQUAMOUS CELL CARCINOMA), LEG, LEFT: ICD-10-CM

## 2025-06-18 DIAGNOSIS — Z85.828 HISTORY OF NONMELANOMA SKIN CANCER: ICD-10-CM

## 2025-06-18 DIAGNOSIS — Z79.899 HIGH RISK MEDICATION USE: ICD-10-CM

## 2025-06-18 NOTE — PROGRESS NOTES
[facility]  Emily Malave M.D.  [phone]       Jus Garcia  1940    84 y.o. male     Date of Visit: 6/18/2025    Chief Complaint:   Chief Complaint   Patient presents with    Skin Lesion        I was asked to see this patient by Dr. Price.    History of Present Illness:  1. Multiple issues and follow up    Previously biopsied squamous cell carcinoma anterior vertex of scalp has recurred-raised, crusted, tender.  Was referred to Mohs 4/25, at the same time he was referred for treatment of basal cell carcinoma on his right dorsal third finger.  States that his Mohs appointment was scheduled but that he was not notified of it and missed it.  Did not reschedule.  Also states he was not informed of pathology from both biopsies although his MyChart shows that he saw a personal message I wrote to him with his results 4/15/2025.    Also confused about his topicals-citing a turquoise bottle that he is using to his face but uncertain which medication this is.      Does not remember using fluorouracil previously, despite using this multiple times    Taking acitretin 25 mg p.o. 3 times per week. Tolerating acitretin well.  Does have declining renal function, which may ultimately limit our use of acitretin.  On acitretin for both chemoprevention for multiple squamous cell carcinomas including recent high risk squamous cell carcinoma left lower cutaneous lip, also for lichen planus.     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  Had deferred treatment for those until today-has healed completely, without evidence of recurrence.         Skin history:  History of metastatic colon cancer currently in remission.  No prior history of sebaceous carcinoma.     History of multiple nonmelanoma skin cancers including multiple squamous cell carcinomas treated previously in his lower legs with curette, Mohs surgery, liquid

## 2025-06-24 LAB — DERMATOLOGY PATHOLOGY REPORT: ABNORMAL

## 2025-06-25 ENCOUNTER — RESULTS FOLLOW-UP (OUTPATIENT)
Age: 85
End: 2025-06-25

## 2025-06-25 DIAGNOSIS — D04.4 SQUAMOUS CELL CARCINOMA IN SITU (SCCIS) OF SKIN OF NECK: Primary | ICD-10-CM

## 2025-06-25 NOTE — RESULT ENCOUNTER NOTE
Pls send referral to raudel lloyd to curette sccis right mid ant neck.    SCC left leg treated with curette, no further treatment for now.    Monitor AKs- recheck next visit.    Pls notify patient- he previously did not remember seeing mychart note. I will send a mychart note also.